# Patient Record
Sex: MALE | Race: WHITE | NOT HISPANIC OR LATINO
[De-identification: names, ages, dates, MRNs, and addresses within clinical notes are randomized per-mention and may not be internally consistent; named-entity substitution may affect disease eponyms.]

---

## 2017-03-07 ENCOUNTER — APPOINTMENT (OUTPATIENT)
Dept: CT IMAGING | Facility: IMAGING CENTER | Age: 82
End: 2017-03-07

## 2017-03-07 ENCOUNTER — OUTPATIENT (OUTPATIENT)
Dept: OUTPATIENT SERVICES | Facility: HOSPITAL | Age: 82
LOS: 1 days | End: 2017-03-07
Payer: MEDICARE

## 2017-03-07 DIAGNOSIS — C18.2 MALIGNANT NEOPLASM OF ASCENDING COLON: ICD-10-CM

## 2017-03-07 DIAGNOSIS — Z98.89 OTHER SPECIFIED POSTPROCEDURAL STATES: Chronic | ICD-10-CM

## 2017-03-07 DIAGNOSIS — S32.409A UNSPECIFIED FRACTURE OF UNSPECIFIED ACETABULUM, INITIAL ENCOUNTER FOR CLOSED FRACTURE: Chronic | ICD-10-CM

## 2017-03-07 PROCEDURE — 74177 CT ABD & PELVIS W/CONTRAST: CPT

## 2017-03-07 PROCEDURE — 71260 CT THORAX DX C+: CPT

## 2017-03-07 PROCEDURE — 82565 ASSAY OF CREATININE: CPT

## 2017-07-14 ENCOUNTER — OUTPATIENT (OUTPATIENT)
Dept: OUTPATIENT SERVICES | Facility: HOSPITAL | Age: 82
LOS: 1 days | End: 2017-07-14
Payer: MEDICARE

## 2017-07-14 ENCOUNTER — APPOINTMENT (OUTPATIENT)
Dept: CT IMAGING | Facility: IMAGING CENTER | Age: 82
End: 2017-07-14

## 2017-07-14 DIAGNOSIS — Z98.89 OTHER SPECIFIED POSTPROCEDURAL STATES: Chronic | ICD-10-CM

## 2017-07-14 DIAGNOSIS — Z00.8 ENCOUNTER FOR OTHER GENERAL EXAMINATION: ICD-10-CM

## 2017-07-14 DIAGNOSIS — S32.409A UNSPECIFIED FRACTURE OF UNSPECIFIED ACETABULUM, INITIAL ENCOUNTER FOR CLOSED FRACTURE: Chronic | ICD-10-CM

## 2017-07-14 PROCEDURE — 71250 CT THORAX DX C-: CPT

## 2018-04-12 ENCOUNTER — OUTPATIENT (OUTPATIENT)
Dept: OUTPATIENT SERVICES | Facility: HOSPITAL | Age: 83
LOS: 1 days | End: 2018-04-12
Payer: MEDICARE

## 2018-04-12 ENCOUNTER — APPOINTMENT (OUTPATIENT)
Dept: CT IMAGING | Facility: IMAGING CENTER | Age: 83
End: 2018-04-12
Payer: MEDICARE

## 2018-04-12 DIAGNOSIS — S32.409A UNSPECIFIED FRACTURE OF UNSPECIFIED ACETABULUM, INITIAL ENCOUNTER FOR CLOSED FRACTURE: Chronic | ICD-10-CM

## 2018-04-12 DIAGNOSIS — Z98.89 OTHER SPECIFIED POSTPROCEDURAL STATES: Chronic | ICD-10-CM

## 2018-04-12 DIAGNOSIS — Z00.8 ENCOUNTER FOR OTHER GENERAL EXAMINATION: ICD-10-CM

## 2018-04-12 PROCEDURE — 71260 CT THORAX DX C+: CPT

## 2018-04-12 PROCEDURE — 82565 ASSAY OF CREATININE: CPT

## 2018-04-12 PROCEDURE — 74177 CT ABD & PELVIS W/CONTRAST: CPT

## 2018-04-12 PROCEDURE — 74177 CT ABD & PELVIS W/CONTRAST: CPT | Mod: 26

## 2018-04-12 PROCEDURE — 71260 CT THORAX DX C+: CPT | Mod: 26

## 2019-02-04 ENCOUNTER — OUTPATIENT (OUTPATIENT)
Dept: OUTPATIENT SERVICES | Facility: HOSPITAL | Age: 84
LOS: 1 days | End: 2019-02-04
Payer: MEDICARE

## 2019-02-04 ENCOUNTER — APPOINTMENT (OUTPATIENT)
Dept: CT IMAGING | Facility: IMAGING CENTER | Age: 84
End: 2019-02-04
Payer: MEDICARE

## 2019-02-04 DIAGNOSIS — S32.409A UNSPECIFIED FRACTURE OF UNSPECIFIED ACETABULUM, INITIAL ENCOUNTER FOR CLOSED FRACTURE: Chronic | ICD-10-CM

## 2019-02-04 DIAGNOSIS — Z00.8 ENCOUNTER FOR OTHER GENERAL EXAMINATION: ICD-10-CM

## 2019-02-04 DIAGNOSIS — Z98.89 OTHER SPECIFIED POSTPROCEDURAL STATES: Chronic | ICD-10-CM

## 2019-02-04 PROCEDURE — 71260 CT THORAX DX C+: CPT | Mod: 26

## 2019-02-04 PROCEDURE — 82565 ASSAY OF CREATININE: CPT

## 2019-02-04 PROCEDURE — 74177 CT ABD & PELVIS W/CONTRAST: CPT

## 2019-02-04 PROCEDURE — 74177 CT ABD & PELVIS W/CONTRAST: CPT | Mod: 26

## 2019-02-04 PROCEDURE — 71260 CT THORAX DX C+: CPT

## 2019-02-21 ENCOUNTER — OUTPATIENT (OUTPATIENT)
Dept: OUTPATIENT SERVICES | Facility: HOSPITAL | Age: 84
LOS: 1 days | End: 2019-02-21
Payer: MEDICARE

## 2019-02-21 VITALS
DIASTOLIC BLOOD PRESSURE: 82 MMHG | OXYGEN SATURATION: 96 % | TEMPERATURE: 98 F | HEIGHT: 68 IN | SYSTOLIC BLOOD PRESSURE: 129 MMHG | RESPIRATION RATE: 18 BRPM | WEIGHT: 164.91 LBS | HEART RATE: 67 BPM

## 2019-02-21 DIAGNOSIS — S32.409A UNSPECIFIED FRACTURE OF UNSPECIFIED ACETABULUM, INITIAL ENCOUNTER FOR CLOSED FRACTURE: Chronic | ICD-10-CM

## 2019-02-21 DIAGNOSIS — Z01.818 ENCOUNTER FOR OTHER PREPROCEDURAL EXAMINATION: ICD-10-CM

## 2019-02-21 DIAGNOSIS — Z98.89 OTHER SPECIFIED POSTPROCEDURAL STATES: Chronic | ICD-10-CM

## 2019-02-21 DIAGNOSIS — N32.89 OTHER SPECIFIED DISORDERS OF BLADDER: ICD-10-CM

## 2019-02-21 LAB
ANION GAP SERPL CALC-SCNC: 14 MMOL/L — SIGNIFICANT CHANGE UP (ref 5–17)
BUN SERPL-MCNC: 23 MG/DL — SIGNIFICANT CHANGE UP (ref 7–23)
CALCIUM SERPL-MCNC: 9.9 MG/DL — SIGNIFICANT CHANGE UP (ref 8.4–10.5)
CHLORIDE SERPL-SCNC: 104 MMOL/L — SIGNIFICANT CHANGE UP (ref 96–108)
CO2 SERPL-SCNC: 22 MMOL/L — SIGNIFICANT CHANGE UP (ref 22–31)
CREAT SERPL-MCNC: 0.69 MG/DL — SIGNIFICANT CHANGE UP (ref 0.5–1.3)
GLUCOSE SERPL-MCNC: 110 MG/DL — HIGH (ref 70–99)
POTASSIUM SERPL-MCNC: 4.4 MMOL/L — SIGNIFICANT CHANGE UP (ref 3.5–5.3)
POTASSIUM SERPL-SCNC: 4.4 MMOL/L — SIGNIFICANT CHANGE UP (ref 3.5–5.3)
SODIUM SERPL-SCNC: 140 MMOL/L — SIGNIFICANT CHANGE UP (ref 135–145)

## 2019-02-21 PROCEDURE — 87086 URINE CULTURE/COLONY COUNT: CPT

## 2019-02-21 PROCEDURE — 85027 COMPLETE CBC AUTOMATED: CPT

## 2019-02-21 PROCEDURE — 80048 BASIC METABOLIC PNL TOTAL CA: CPT

## 2019-02-21 PROCEDURE — G0463: CPT

## 2019-02-21 RX ORDER — LIDOCAINE HCL 20 MG/ML
0.2 VIAL (ML) INJECTION ONCE
Qty: 0 | Refills: 0 | Status: DISCONTINUED | OUTPATIENT
Start: 2019-02-28 | End: 2019-03-15

## 2019-02-21 RX ORDER — CEFAZOLIN SODIUM 1 G
2000 VIAL (EA) INJECTION ONCE
Qty: 0 | Refills: 0 | Status: DISCONTINUED | OUTPATIENT
Start: 2019-02-28 | End: 2019-03-15

## 2019-02-21 RX ORDER — SODIUM CHLORIDE 9 MG/ML
3 INJECTION INTRAMUSCULAR; INTRAVENOUS; SUBCUTANEOUS EVERY 8 HOURS
Qty: 0 | Refills: 0 | Status: DISCONTINUED | OUTPATIENT
Start: 2019-02-28 | End: 2019-03-15

## 2019-02-21 NOTE — H&P PST ADULT - NSANTHOSAYNRD_GEN_A_CORE
No. MARCIO screening performed.  STOP BANG Legend: 0-2 = LOW Risk; 3-4 = INTERMEDIATE Risk; 5-8 = HIGH Risk

## 2019-02-21 NOTE — H&P PST ADULT - HISTORY OF PRESENT ILLNESS
Pt. is a 83 year old male with PMH. Pt. is now scheduled for a Transurethral resection of bladder tumor on 2/28/2019. Pt. is a 83 year old male with PMH of hld, colon ca colon resection and chemotherapy 2015. Pt. is now scheduled for a Transurethral resection of bladder tumor on 2/28/2019. Pt. is a 83 year old male with PMH of hld, colon ca colon resection and chemotherapy 2015. During follow up with oncologist incidental finding of nodule in the bladder on CT scan. pt. has not been experiencing any symptoms Pt. is now scheduled for a Transurethral resection of bladder tumor on 2/28/2019. Pt. is a 83 year old male with PMH of hld, colon ca colon resection and chemotherapy 2015. During follow up with oncologist incidental finding of nodule in the bladder on CT scan. pt. has not been experiencing any symptoms Pt. is now scheduled for a cystoscopy Transurethral resection of bladder tumor on 2/28/2019.

## 2019-02-21 NOTE — H&P PST ADULT - PROBLEM SELECTOR PLAN 1
Pt. scheduled Cystoscopy Transurethral resection of bladder tumor on 2/28/2019.  CBC ,BMP, urine culture   EKG done on 2/13/19 at Dr. Freed

## 2019-02-22 LAB
CULTURE RESULTS: SIGNIFICANT CHANGE UP
HCT VFR BLD CALC: 44.5 % — SIGNIFICANT CHANGE UP (ref 39–50)
HGB BLD-MCNC: 14.1 G/DL — SIGNIFICANT CHANGE UP (ref 13–17)
MCHC RBC-ENTMCNC: 31.7 GM/DL — LOW (ref 32–36)
MCHC RBC-ENTMCNC: 32.2 PG — SIGNIFICANT CHANGE UP (ref 27–34)
MCV RBC AUTO: 101.6 FL — HIGH (ref 80–100)
PLATELET # BLD AUTO: 318 K/UL — SIGNIFICANT CHANGE UP (ref 150–400)
RBC # BLD: 4.38 M/UL — SIGNIFICANT CHANGE UP (ref 4.2–5.8)
RBC # FLD: 14 % — SIGNIFICANT CHANGE UP (ref 10.3–14.5)
SPECIMEN SOURCE: SIGNIFICANT CHANGE UP
WBC # BLD: 8.34 K/UL — SIGNIFICANT CHANGE UP (ref 3.8–10.5)
WBC # FLD AUTO: 8.34 K/UL — SIGNIFICANT CHANGE UP (ref 3.8–10.5)

## 2019-02-27 ENCOUNTER — TRANSCRIPTION ENCOUNTER (OUTPATIENT)
Age: 84
End: 2019-02-27

## 2019-02-28 ENCOUNTER — RESULT REVIEW (OUTPATIENT)
Age: 84
End: 2019-02-28

## 2019-02-28 ENCOUNTER — OUTPATIENT (OUTPATIENT)
Dept: OUTPATIENT SERVICES | Facility: HOSPITAL | Age: 84
LOS: 1 days | End: 2019-02-28
Payer: MEDICARE

## 2019-02-28 VITALS
SYSTOLIC BLOOD PRESSURE: 126 MMHG | TEMPERATURE: 98 F | HEART RATE: 60 BPM | OXYGEN SATURATION: 97 % | RESPIRATION RATE: 18 BRPM | DIASTOLIC BLOOD PRESSURE: 74 MMHG

## 2019-02-28 VITALS
SYSTOLIC BLOOD PRESSURE: 146 MMHG | RESPIRATION RATE: 18 BRPM | OXYGEN SATURATION: 97 % | HEIGHT: 68 IN | DIASTOLIC BLOOD PRESSURE: 72 MMHG | HEART RATE: 68 BPM | WEIGHT: 164.91 LBS | TEMPERATURE: 98 F

## 2019-02-28 DIAGNOSIS — Z98.89 OTHER SPECIFIED POSTPROCEDURAL STATES: Chronic | ICD-10-CM

## 2019-02-28 DIAGNOSIS — S32.409A UNSPECIFIED FRACTURE OF UNSPECIFIED ACETABULUM, INITIAL ENCOUNTER FOR CLOSED FRACTURE: Chronic | ICD-10-CM

## 2019-02-28 DIAGNOSIS — N32.89 OTHER SPECIFIED DISORDERS OF BLADDER: ICD-10-CM

## 2019-02-28 PROCEDURE — 52235 CYSTOSCOPY AND TREATMENT: CPT

## 2019-02-28 PROCEDURE — 88305 TISSUE EXAM BY PATHOLOGIST: CPT

## 2019-02-28 PROCEDURE — 88305 TISSUE EXAM BY PATHOLOGIST: CPT | Mod: 26

## 2019-02-28 RX ORDER — ONDANSETRON 8 MG/1
4 TABLET, FILM COATED ORAL ONCE
Qty: 0 | Refills: 0 | Status: DISCONTINUED | OUTPATIENT
Start: 2019-02-28 | End: 2019-02-28

## 2019-02-28 RX ORDER — SODIUM CHLORIDE 9 MG/ML
1000 INJECTION, SOLUTION INTRAVENOUS
Qty: 0 | Refills: 0 | Status: DISCONTINUED | OUTPATIENT
Start: 2019-02-28 | End: 2019-03-15

## 2019-02-28 RX ORDER — ATORVASTATIN CALCIUM 80 MG/1
1 TABLET, FILM COATED ORAL
Qty: 0 | Refills: 0 | COMMUNITY

## 2019-02-28 RX ORDER — PHENAZOPYRIDINE HCL 100 MG
1 TABLET ORAL
Qty: 6 | Refills: 0 | OUTPATIENT
Start: 2019-02-28 | End: 2019-03-01

## 2019-02-28 RX ORDER — DENOSUMAB 60 MG/ML
0 INJECTION SUBCUTANEOUS
Qty: 0 | Refills: 0 | COMMUNITY

## 2019-02-28 RX ORDER — ASPIRIN/CALCIUM CARB/MAGNESIUM 324 MG
1 TABLET ORAL
Qty: 0 | Refills: 0 | COMMUNITY

## 2019-02-28 RX ORDER — CEPHALEXIN 500 MG
1 CAPSULE ORAL
Qty: 6 | Refills: 0 | OUTPATIENT
Start: 2019-02-28 | End: 2019-03-02

## 2019-02-28 RX ORDER — TAMSULOSIN HYDROCHLORIDE 0.4 MG/1
1 CAPSULE ORAL
Qty: 0 | Refills: 0 | COMMUNITY

## 2019-02-28 RX ORDER — ASPIRIN/CALCIUM CARB/MAGNESIUM 324 MG
81 TABLET ORAL DAILY
Qty: 0 | Refills: 0 | Status: DISCONTINUED | OUTPATIENT
Start: 2019-02-28 | End: 2019-03-15

## 2019-02-28 RX ORDER — HYDROMORPHONE HYDROCHLORIDE 2 MG/ML
0.25 INJECTION INTRAMUSCULAR; INTRAVENOUS; SUBCUTANEOUS
Qty: 0 | Refills: 0 | Status: DISCONTINUED | OUTPATIENT
Start: 2019-02-28 | End: 2019-02-28

## 2019-02-28 RX ADMIN — SODIUM CHLORIDE 3 MILLILITER(S): 9 INJECTION INTRAMUSCULAR; INTRAVENOUS; SUBCUTANEOUS at 08:16

## 2019-02-28 NOTE — ASU DISCHARGE PLAN (ADULT/PEDIATRIC). - SPECIAL INSTRUCTIONS
Please follow-up with Dr. Goldberg in his office tomorrow morning (3/1) for yeh catheter removal.    Please take prescribed antibiotics until course is completed. Take pyridium as prescribed for urinary irritation.

## 2019-02-28 NOTE — ASU DISCHARGE PLAN (ADULT/PEDIATRIC). - MEDICATION SUMMARY - MEDICATIONS TO TAKE
I will START or STAY ON the medications listed below when I get home from the hospital:    aspirin 81 mg oral tablet, chewable  -- 1 tab(s) by mouth once a day  -- Indication: For Home med    tamsulosin 0.4 mg oral capsule  -- 1 cap(s) by mouth once a day  -- Indication: For Home med    atorvastatin 10 mg oral tablet  -- 1 tab(s) by mouth once a day  -- Indication: For Home med    Prolia 60 mg/mL subcutaneous solution  -- every 6 months   -- Indication: For Home med    Keflex 500 mg oral capsule  -- 1 cap(s) by mouth 2 times a day   -- Finish all this medication unless otherwise directed by prescriber.    -- Indication: For Antibiotic    Pyridium 200 mg oral tablet  -- 1 tab(s) by mouth 3 times a day   -- May discolor urine or feces.  Medication should be taken with plenty of water.  Take with food or milk.    -- Indication: For Urinary discomfort    multivitamin  --   by mouth daily  -- Indication: For Home supplement    ergocalciferol 50,000 intl units (1.25 mg) oral capsule  --  by mouth once a week  -- Indication: For Home supplement

## 2019-02-28 NOTE — BRIEF OPERATIVE NOTE - PROCEDURE
<<-----Click on this checkbox to enter Procedure TURBT (transurethral resection of bladder tumor)  02/28/2019    Active  [id number]

## 2019-02-28 NOTE — BRIEF OPERATIVE NOTE - OPERATION/FINDINGS
~1cm papillary appearing tumor along right lateral wall    No evidence of tumor on bimanual exam 3 cm papillary appearing tumor along right lateral wall    No evidence of tumor on bimanual exam

## 2019-03-01 LAB — SURGICAL PATHOLOGY STUDY: SIGNIFICANT CHANGE UP

## 2019-05-30 NOTE — PRE-ANESTHESIA EVALUATION ADULT - NSANTHBPHIGHRD_ENT_A_CORE
 Continue Breo ellipta daily,  rinse mouth out after use     Continue albuterol  MDI every 4-6 hours as needed for shortness of breath, wheezing, or cough    · Take over the counter mucinex for cough, increase PO fluids    · Continue a azithromycin, 250 mg daily    · Continue to use coolmist humidifier at night     Take over the counter flonase and / or claritin / zyrtec daily for allergy control    · Avoid allergens/precipitants as much as possible     Follow-up in pulmonary clinic in 6 months or sooner with worsening of symptoms     Report to the ER with chest pain or difficulty breathing
No

## 2019-10-02 ENCOUNTER — EMERGENCY (EMERGENCY)
Facility: HOSPITAL | Age: 84
LOS: 1 days | Discharge: HOME | End: 2019-10-02
Attending: EMERGENCY MEDICINE
Payer: MEDICARE

## 2019-10-02 VITALS
OXYGEN SATURATION: 97 % | HEART RATE: 72 BPM | SYSTOLIC BLOOD PRESSURE: 105 MMHG | DIASTOLIC BLOOD PRESSURE: 91 MMHG | RESPIRATION RATE: 18 BRPM | TEMPERATURE: 98 F

## 2019-10-02 VITALS
HEIGHT: 68 IN | TEMPERATURE: 101 F | DIASTOLIC BLOOD PRESSURE: 75 MMHG | OXYGEN SATURATION: 97 % | SYSTOLIC BLOOD PRESSURE: 130 MMHG | RESPIRATION RATE: 20 BRPM | WEIGHT: 158.95 LBS | HEART RATE: 87 BPM

## 2019-10-02 DIAGNOSIS — Z98.89 OTHER SPECIFIED POSTPROCEDURAL STATES: Chronic | ICD-10-CM

## 2019-10-02 DIAGNOSIS — S32.409A UNSPECIFIED FRACTURE OF UNSPECIFIED ACETABULUM, INITIAL ENCOUNTER FOR CLOSED FRACTURE: Chronic | ICD-10-CM

## 2019-10-02 PROBLEM — E78.5 HYPERLIPIDEMIA, UNSPECIFIED: Chronic | Status: ACTIVE | Noted: 2019-02-21

## 2019-10-02 PROBLEM — C18.9 MALIGNANT NEOPLASM OF COLON, UNSPECIFIED: Chronic | Status: ACTIVE | Noted: 2019-02-21

## 2019-10-02 LAB
ALBUMIN SERPL ELPH-MCNC: 4.1 G/DL — SIGNIFICANT CHANGE UP (ref 3.3–5)
ALP SERPL-CCNC: 54 U/L — SIGNIFICANT CHANGE UP (ref 40–120)
ALT FLD-CCNC: 8 U/L — LOW (ref 10–45)
ANION GAP SERPL CALC-SCNC: 15 MMOL/L — SIGNIFICANT CHANGE UP (ref 5–17)
APPEARANCE UR: CLEAR — SIGNIFICANT CHANGE UP
AST SERPL-CCNC: 9 U/L — LOW (ref 10–40)
BACTERIA # UR AUTO: NEGATIVE — SIGNIFICANT CHANGE UP
BASOPHILS # BLD AUTO: 0 K/UL — SIGNIFICANT CHANGE UP (ref 0–0.2)
BASOPHILS NFR BLD AUTO: 0 % — SIGNIFICANT CHANGE UP (ref 0–2)
BILIRUB SERPL-MCNC: 0.4 MG/DL — SIGNIFICANT CHANGE UP (ref 0.2–1.2)
BILIRUB UR-MCNC: NEGATIVE — SIGNIFICANT CHANGE UP
BUN SERPL-MCNC: 18 MG/DL — SIGNIFICANT CHANGE UP (ref 7–23)
CALCIUM SERPL-MCNC: 9.3 MG/DL — SIGNIFICANT CHANGE UP (ref 8.4–10.5)
CHLORIDE SERPL-SCNC: 98 MMOL/L — SIGNIFICANT CHANGE UP (ref 96–108)
CO2 SERPL-SCNC: 24 MMOL/L — SIGNIFICANT CHANGE UP (ref 22–31)
COLOR SPEC: YELLOW — SIGNIFICANT CHANGE UP
CREAT SERPL-MCNC: 0.83 MG/DL — SIGNIFICANT CHANGE UP (ref 0.5–1.3)
DIFF PNL FLD: ABNORMAL
EOSINOPHIL # BLD AUTO: 0 K/UL — SIGNIFICANT CHANGE UP (ref 0–0.5)
EOSINOPHIL NFR BLD AUTO: 0 % — SIGNIFICANT CHANGE UP (ref 0–6)
EPI CELLS # UR: 1 /HPF — SIGNIFICANT CHANGE UP
GAS PNL BLDV: SIGNIFICANT CHANGE UP
GAS PNL BLDV: SIGNIFICANT CHANGE UP
GLUCOSE SERPL-MCNC: 139 MG/DL — HIGH (ref 70–99)
GLUCOSE UR QL: NEGATIVE — SIGNIFICANT CHANGE UP
HCT VFR BLD CALC: 40.6 % — SIGNIFICANT CHANGE UP (ref 39–50)
HGB BLD-MCNC: 13.7 G/DL — SIGNIFICANT CHANGE UP (ref 13–17)
HYALINE CASTS # UR AUTO: 0 /LPF — SIGNIFICANT CHANGE UP (ref 0–7)
KETONES UR-MCNC: ABNORMAL
LEUKOCYTE ESTERASE UR-ACNC: NEGATIVE — SIGNIFICANT CHANGE UP
LYMPHOCYTES # BLD AUTO: 0.37 K/UL — LOW (ref 1–3.3)
LYMPHOCYTES # BLD AUTO: 1.7 % — LOW (ref 13–44)
MCHC RBC-ENTMCNC: 31.9 PG — SIGNIFICANT CHANGE UP (ref 27–34)
MCHC RBC-ENTMCNC: 33.7 GM/DL — SIGNIFICANT CHANGE UP (ref 32–36)
MCV RBC AUTO: 94.4 FL — SIGNIFICANT CHANGE UP (ref 80–100)
MONOCYTES # BLD AUTO: 1.55 K/UL — HIGH (ref 0–0.9)
MONOCYTES NFR BLD AUTO: 7.1 % — SIGNIFICANT CHANGE UP (ref 2–14)
NEUTROPHILS # BLD AUTO: 19.69 K/UL — HIGH (ref 1.8–7.4)
NEUTROPHILS NFR BLD AUTO: 84.1 % — HIGH (ref 43–77)
NITRITE UR-MCNC: NEGATIVE — SIGNIFICANT CHANGE UP
PH UR: 6 — SIGNIFICANT CHANGE UP (ref 5–8)
PLATELET # BLD AUTO: 334 K/UL — SIGNIFICANT CHANGE UP (ref 150–400)
POTASSIUM SERPL-MCNC: 4.2 MMOL/L — SIGNIFICANT CHANGE UP (ref 3.5–5.3)
POTASSIUM SERPL-SCNC: 4.2 MMOL/L — SIGNIFICANT CHANGE UP (ref 3.5–5.3)
PROT SERPL-MCNC: 7.1 G/DL — SIGNIFICANT CHANGE UP (ref 6–8.3)
PROT UR-MCNC: 100 — SIGNIFICANT CHANGE UP
RAPID RVP RESULT: SIGNIFICANT CHANGE UP
RBC # BLD: 4.3 M/UL — SIGNIFICANT CHANGE UP (ref 4.2–5.8)
RBC # FLD: 13 % — SIGNIFICANT CHANGE UP (ref 10.3–14.5)
RBC CASTS # UR COMP ASSIST: 21 /HPF — HIGH (ref 0–4)
SODIUM SERPL-SCNC: 137 MMOL/L — SIGNIFICANT CHANGE UP (ref 135–145)
SP GR SPEC: 1.03 — HIGH (ref 1.01–1.02)
TROPONIN T, HIGH SENSITIVITY RESULT: 14 NG/L — SIGNIFICANT CHANGE UP (ref 0–51)
TROPONIN T, HIGH SENSITIVITY RESULT: 14 NG/L — SIGNIFICANT CHANGE UP (ref 0–51)
UROBILINOGEN FLD QL: NEGATIVE — SIGNIFICANT CHANGE UP
WBC # BLD: 21.8 K/UL — HIGH (ref 3.8–10.5)
WBC # FLD AUTO: 21.8 K/UL — HIGH (ref 3.8–10.5)
WBC UR QL: 2 /HPF — SIGNIFICANT CHANGE UP (ref 0–5)

## 2019-10-02 PROCEDURE — 99284 EMERGENCY DEPT VISIT MOD MDM: CPT

## 2019-10-02 PROCEDURE — 71046 X-RAY EXAM CHEST 2 VIEWS: CPT | Mod: 26

## 2019-10-02 RX ORDER — SODIUM CHLORIDE 9 MG/ML
1000 INJECTION INTRAMUSCULAR; INTRAVENOUS; SUBCUTANEOUS ONCE
Refills: 0 | Status: COMPLETED | OUTPATIENT
Start: 2019-10-02 | End: 2019-10-02

## 2019-10-02 RX ORDER — ACETAMINOPHEN 500 MG
975 TABLET ORAL ONCE
Refills: 0 | Status: COMPLETED | OUTPATIENT
Start: 2019-10-02 | End: 2019-10-02

## 2019-10-02 RX ADMIN — SODIUM CHLORIDE 1000 MILLILITER(S): 9 INJECTION INTRAMUSCULAR; INTRAVENOUS; SUBCUTANEOUS at 16:29

## 2019-10-02 RX ADMIN — Medication 975 MILLIGRAM(S): at 16:29

## 2019-10-02 NOTE — ED ADULT NURSE NOTE - NSIMPLEMENTINTERV_GEN_ALL_ED
Implemented All Universal Safety Interventions:  [place] to call system. Call bell, personal items and telephone within reach. Instruct patient to call for assistance. Room bathroom lighting operational. Non-slip footwear when patient is off stretcher. Physically safe environment: no spills, clutter or unnecessary equipment. Stretcher in lowest position, wheels locked, appropriate side rails in place.

## 2019-10-02 NOTE — ED PROVIDER NOTE - OBJECTIVE STATEMENT
85 y/o M no significant pmhx presenting with fever, chills for 3 days with myalgias. Patient states that he went to his PMD Dr. Freed today for evaluation, and after flu was negative and patient had fever in the office he was sent to the ED for more work-up. Patient denies any cough, urinary symptoms, headaches, nausea, vomiting, abdominal pain. He states that he had a R hip replacement in july and has been having some residual knee pain; however, knee has been not red or inflamed.

## 2019-10-02 NOTE — ED PROVIDER NOTE - NSFOLLOWUPINSTRUCTIONS_ED_ALL_ED_FT
1. Continue to take any home medications as needed and tylenol or motrin as needed for fever   2. Follow-up with Dr. Freed tomorrow   3. Return to the ER for any new or worsening symptoms

## 2019-10-02 NOTE — ED ADULT NURSE NOTE - OBJECTIVE STATEMENT
84y male coming in from home c/o weakness. A&Ox3 with wife at bedside. 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 Pt presents to ED c/o weakness, chills and headache starting yesterday morning. Pt did not take temperature. Pt also reports myalgias and b/l knee joint pain. Denies chest pain, sob, n/v/d. Denies abdominal pain and urinary symptoms. Upon exam, lung sounds clear 84y male coming in from home c/o weakness. A&Ox3 with wife at bedside. 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 Pt presents to ED c/o weakness, chills and headache starting yesterday morning. Pt did not take temperature. Pt also reports myalgias and b/l knee joint pain. Denies chest pain, sob, n/v/d. Denies abdominal pain and urinary symptoms. Upon exam, pt febrile to 101.6F orally. Lung sounds clear. Abdomen soft, nontender. No redness/warmth/swelling noted to knees or hip. Cap resill <2 sec. 84y male coming in from home c/o weakness. A&Ox3 with wife at bedside. 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 Pt presents to ED c/o weakness, chills and headache starting yesterday morning. Pt did not take temperature at home. Pt also reports myalgias and b/l knee joint pain. Denies chest pain, sob, n/v/d. Denies abdominal pain and urinary symptoms. Pt presented to PMD today who recommend pt come to ED. Flu swab negative at PMD office as per pt. Upon exam, pt febrile to 101.6F orally. Lung sounds clear. Abdomen soft, nontender. No redness/warmth/swelling noted to knees or hip. Cap refill <2 sec.

## 2019-10-02 NOTE — ED PROVIDER NOTE - PROGRESS NOTE DETAILS
spoke with patient's PMD. urine, cxr and labs are within normal limits aside from WBC of 21 of unknown origin. advised him on results and given no source will follow-up blood and urine cultures as patient wants to go home. Dr. Freed states OK with d/c given following cultures and will see patient tomorrow. Will obtain RVP and reassess. -Ruth Gonzáles PA-C refusing to stay for RVP as has to get home to his dog. will follow-up RVP and contact patient as needed. -Ruth Gonzáles PA-C Dr. Marie: Pt signed out to me. Pt well appearing, elevated WBC count, febrile defervesced in ED, Lactate cleared, feels well after fluids, appears non toxic, is reliable and has plan for FU w PCP tomorrow. Given return precautions. Will call with RVP results as he does not wish to stay and would not likely change mgmt given OP flu swab neg.

## 2019-10-02 NOTE — ED ADULT NURSE REASSESSMENT NOTE - NS ED NURSE REASSESS COMMENT FT1
Repeat troponin and lactate sent to lab. Eliecer MARSHALL states if repeat results okay, pt to be discharged home with PMD follow up. Pt aware of plan of care.

## 2019-10-02 NOTE — ED PROVIDER NOTE - ATTENDING CONTRIBUTION TO CARE
84yr M hx of colon CA s/p resection not on chemo p/w 1-2 days of malaise, fevers from PMD office. had a neg flu swab there. reports having muscle aches and feeling under the weather. no vomiting, no cp. sob, had slight non productive cough, no urinary sx, no abd pain. denies rashes, no hip pain but does have rt knee pain.     exam notable for clear lungs, oropharynx, no murmur, warm to touch, soft non tender abd, full ROM in hip.  plan for infectious work up. 84yr M hx of colon CA s/p resection not on chemo p/w 1-2 days of malaise, fevers from PMD office. had a neg flu swab there. reports having muscle aches and feeling under the weather. no vomiting, no cp. sob, had slight non productive cough, no urinary sx, no abd pain. denies rashes, no hip pain but does have rt knee pain.     exam notable for clear lungs, oropharynx, no murmur, warm to touch, soft non tender abd, full ROM in hip.  plan for infectious work up.    work up notable for wbc of 20 but neg UA and CXR. pt continues to feel well. expresses desire to go home. had an extensive discussion with patient and wife regarding the recommendation of staying in [facility]U for continued monitoring and cultures, however pt says he prefers to go home, and will follow up the next day with his doctor. he lives [place]mins away with his wife, and appears to be reliable in following up.   PMD was contacted and agrees with close follow up.

## 2019-10-02 NOTE — ED PROVIDER NOTE - PATIENT PORTAL LINK FT
You can access the FollowMyHealth Patient Portal offered by [facility] by registering at the following website: [email or web address]. By joining [employer]’s FollowMyHealth portal, you will also be able to view your health information using other applications (apps) compatible with our system.

## 2019-10-02 NOTE — ED PROVIDER NOTE - NS ED ROS FT
Constitutional: +fever and chills  Eyes: No visual changes, eye pain or redness  HEENT: No throat pain, ear pain, nasal pain. No nose bleeding.  CV: No chest pain or lower extremity edema  Resp: No SOB no cough  GI: No abd pain. No nausea or vomiting. No diarrhea. No constipation.   : No dysuria, hematuria.   MSK: No musculoskeletal pain  Skin: No rash  Neuro: No headache. No numbness or tingling. No weakness.

## 2019-10-03 ENCOUNTER — INPATIENT (INPATIENT)
Facility: HOSPITAL | Age: 84
LOS: 0 days | Discharge: HOME | DRG: 560 | End: 2019-10-03
Attending: INTERNAL MEDICINE | Admitting: INTERNAL MEDICINE
Payer: MEDICARE

## 2019-10-03 VITALS
HEIGHT: 68 IN | DIASTOLIC BLOOD PRESSURE: 87 MMHG | RESPIRATION RATE: 18 BRPM | TEMPERATURE: 98 F | HEART RATE: 72 BPM | OXYGEN SATURATION: 97 % | WEIGHT: 158.95 LBS | SYSTOLIC BLOOD PRESSURE: 134 MMHG

## 2019-10-03 VITALS
DIASTOLIC BLOOD PRESSURE: 80 MMHG | OXYGEN SATURATION: 100 % | RESPIRATION RATE: 17 BRPM | TEMPERATURE: 98 F | HEART RATE: 68 BPM | SYSTOLIC BLOOD PRESSURE: 134 MMHG

## 2019-10-03 DIAGNOSIS — R78.81 BACTEREMIA: ICD-10-CM

## 2019-10-03 DIAGNOSIS — S32.409A UNSPECIFIED FRACTURE OF UNSPECIFIED ACETABULUM, INITIAL ENCOUNTER FOR CLOSED FRACTURE: Chronic | ICD-10-CM

## 2019-10-03 DIAGNOSIS — Z98.89 OTHER SPECIFIED POSTPROCEDURAL STATES: Chronic | ICD-10-CM

## 2019-10-03 LAB
ALBUMIN SERPL ELPH-MCNC: 3.6 G/DL — SIGNIFICANT CHANGE UP (ref 3.3–5)
ALP SERPL-CCNC: 59 U/L — SIGNIFICANT CHANGE UP (ref 40–120)
ALT FLD-CCNC: 7 U/L — LOW (ref 10–45)
ANION GAP SERPL CALC-SCNC: 15 MMOL/L — SIGNIFICANT CHANGE UP (ref 5–17)
APTT BLD: 29.4 SEC — SIGNIFICANT CHANGE UP (ref 27.5–36.3)
AST SERPL-CCNC: 14 U/L — SIGNIFICANT CHANGE UP (ref 10–40)
BASOPHILS # BLD AUTO: 0.02 K/UL — SIGNIFICANT CHANGE UP (ref 0–0.2)
BASOPHILS NFR BLD AUTO: 0.1 % — SIGNIFICANT CHANGE UP (ref 0–2)
BILIRUB SERPL-MCNC: 0.3 MG/DL — SIGNIFICANT CHANGE UP (ref 0.2–1.2)
BUN SERPL-MCNC: 20 MG/DL — SIGNIFICANT CHANGE UP (ref 7–23)
CALCIUM SERPL-MCNC: 8.7 MG/DL — SIGNIFICANT CHANGE UP (ref 8.4–10.5)
CHLORIDE SERPL-SCNC: 100 MMOL/L — SIGNIFICANT CHANGE UP (ref 96–108)
CO2 SERPL-SCNC: 20 MMOL/L — LOW (ref 22–31)
CREAT SERPL-MCNC: 0.69 MG/DL — SIGNIFICANT CHANGE UP (ref 0.5–1.3)
CRP SERPL-MCNC: 25.21 MG/DL — HIGH (ref 0–0.4)
CULTURE RESULTS: SIGNIFICANT CHANGE UP
EOSINOPHIL # BLD AUTO: 0.01 K/UL — SIGNIFICANT CHANGE UP (ref 0–0.5)
EOSINOPHIL NFR BLD AUTO: 0.1 % — SIGNIFICANT CHANGE UP (ref 0–6)
GAS PNL BLDV: SIGNIFICANT CHANGE UP
GLUCOSE SERPL-MCNC: 125 MG/DL — HIGH (ref 70–99)
GP B STREP DNA BLD POS QL NAA+NON-PROBE: SIGNIFICANT CHANGE UP
GRAM STN FLD: SIGNIFICANT CHANGE UP
HCT VFR BLD CALC: 37 % — LOW (ref 39–50)
HGB BLD-MCNC: 12.7 G/DL — LOW (ref 13–17)
IMM GRANULOCYTES NFR BLD AUTO: 0.6 % — SIGNIFICANT CHANGE UP (ref 0–1.5)
INR BLD: 1.21 RATIO — HIGH (ref 0.88–1.16)
LYMPHOCYTES # BLD AUTO: 0.93 K/UL — LOW (ref 1–3.3)
LYMPHOCYTES # BLD AUTO: 5.8 % — LOW (ref 13–44)
MCHC RBC-ENTMCNC: 32.2 PG — SIGNIFICANT CHANGE UP (ref 27–34)
MCHC RBC-ENTMCNC: 34.3 GM/DL — SIGNIFICANT CHANGE UP (ref 32–36)
MCV RBC AUTO: 93.7 FL — SIGNIFICANT CHANGE UP (ref 80–100)
METHOD TYPE: SIGNIFICANT CHANGE UP
MONOCYTES # BLD AUTO: 1.17 K/UL — HIGH (ref 0–0.9)
MONOCYTES NFR BLD AUTO: 7.2 % — SIGNIFICANT CHANGE UP (ref 2–14)
NEUTROPHILS # BLD AUTO: 13.94 K/UL — HIGH (ref 1.8–7.4)
NEUTROPHILS NFR BLD AUTO: 86.2 % — HIGH (ref 43–77)
NRBC # BLD: 0 /100 WBCS — SIGNIFICANT CHANGE UP (ref 0–0)
PLATELET # BLD AUTO: 262 K/UL — SIGNIFICANT CHANGE UP (ref 150–400)
POTASSIUM SERPL-MCNC: 4.3 MMOL/L — SIGNIFICANT CHANGE UP (ref 3.5–5.3)
POTASSIUM SERPL-SCNC: 4.3 MMOL/L — SIGNIFICANT CHANGE UP (ref 3.5–5.3)
PROT SERPL-MCNC: 6.8 G/DL — SIGNIFICANT CHANGE UP (ref 6–8.3)
PROTHROM AB SERPL-ACNC: 13.9 SEC — HIGH (ref 10–12.9)
RBC # BLD: 3.95 M/UL — LOW (ref 4.2–5.8)
RBC # FLD: 12.9 % — SIGNIFICANT CHANGE UP (ref 10.3–14.5)
SODIUM SERPL-SCNC: 135 MMOL/L — SIGNIFICANT CHANGE UP (ref 135–145)
SPECIMEN SOURCE: SIGNIFICANT CHANGE UP
SPECIMEN SOURCE: SIGNIFICANT CHANGE UP
WBC # BLD: 16.16 K/UL — HIGH (ref 3.8–10.5)
WBC # FLD AUTO: 16.16 K/UL — HIGH (ref 3.8–10.5)

## 2019-10-03 PROCEDURE — 87486 CHLMYD PNEUM DNA AMP PROBE: CPT

## 2019-10-03 PROCEDURE — 82803 BLOOD GASES ANY COMBINATION: CPT

## 2019-10-03 PROCEDURE — 74177 CT ABD & PELVIS W/CONTRAST: CPT

## 2019-10-03 PROCEDURE — 99285 EMERGENCY DEPT VISIT HI MDM: CPT

## 2019-10-03 PROCEDURE — 87040 BLOOD CULTURE FOR BACTERIA: CPT

## 2019-10-03 PROCEDURE — 82435 ASSAY OF BLOOD CHLORIDE: CPT

## 2019-10-03 PROCEDURE — 80053 COMPREHEN METABOLIC PANEL: CPT

## 2019-10-03 PROCEDURE — 85014 HEMATOCRIT: CPT

## 2019-10-03 PROCEDURE — 99284 EMERGENCY DEPT VISIT MOD MDM: CPT | Mod: 25

## 2019-10-03 PROCEDURE — 87581 M.PNEUMON DNA AMP PROBE: CPT

## 2019-10-03 PROCEDURE — 85652 RBC SED RATE AUTOMATED: CPT

## 2019-10-03 PROCEDURE — 84484 ASSAY OF TROPONIN QUANT: CPT

## 2019-10-03 PROCEDURE — 83605 ASSAY OF LACTIC ACID: CPT

## 2019-10-03 PROCEDURE — 84132 ASSAY OF SERUM POTASSIUM: CPT

## 2019-10-03 PROCEDURE — 85610 PROTHROMBIN TIME: CPT

## 2019-10-03 PROCEDURE — 84295 ASSAY OF SERUM SODIUM: CPT

## 2019-10-03 PROCEDURE — 81001 URINALYSIS AUTO W/SCOPE: CPT

## 2019-10-03 PROCEDURE — 74177 CT ABD & PELVIS W/CONTRAST: CPT | Mod: 26

## 2019-10-03 PROCEDURE — 82330 ASSAY OF CALCIUM: CPT

## 2019-10-03 PROCEDURE — 87150 DNA/RNA AMPLIFIED PROBE: CPT

## 2019-10-03 PROCEDURE — 85730 THROMBOPLASTIN TIME PARTIAL: CPT

## 2019-10-03 PROCEDURE — 71046 X-RAY EXAM CHEST 2 VIEWS: CPT

## 2019-10-03 PROCEDURE — 82947 ASSAY GLUCOSE BLOOD QUANT: CPT

## 2019-10-03 PROCEDURE — 87798 DETECT AGENT NOS DNA AMP: CPT

## 2019-10-03 PROCEDURE — 85027 COMPLETE CBC AUTOMATED: CPT

## 2019-10-03 PROCEDURE — 99291 CRITICAL CARE FIRST HOUR: CPT

## 2019-10-03 PROCEDURE — 87086 URINE CULTURE/COLONY COUNT: CPT

## 2019-10-03 PROCEDURE — 86140 C-REACTIVE PROTEIN: CPT

## 2019-10-03 PROCEDURE — 87633 RESP VIRUS 12-25 TARGETS: CPT

## 2019-10-03 PROCEDURE — 93005 ELECTROCARDIOGRAM TRACING: CPT

## 2019-10-03 PROCEDURE — 87184 SC STD DISK METHOD PER PLATE: CPT

## 2019-10-03 RX ORDER — ATORVASTATIN CALCIUM 80 MG/1
10 TABLET, FILM COATED ORAL AT BEDTIME
Refills: 0 | Status: DISCONTINUED | OUTPATIENT
Start: 2019-10-03 | End: 2019-10-03

## 2019-10-03 RX ORDER — CEFTRIAXONE 500 MG/1
1000 INJECTION, POWDER, FOR SOLUTION INTRAMUSCULAR; INTRAVENOUS ONCE
Refills: 0 | Status: COMPLETED | OUTPATIENT
Start: 2019-10-03 | End: 2019-10-03

## 2019-10-03 RX ORDER — CEFTRIAXONE 500 MG/1
1000 INJECTION, POWDER, FOR SOLUTION INTRAMUSCULAR; INTRAVENOUS EVERY 24 HOURS
Refills: 0 | Status: DISCONTINUED | OUTPATIENT
Start: 2019-10-03 | End: 2019-10-03

## 2019-10-03 RX ORDER — OXYCODONE HYDROCHLORIDE 5 MG/1
5 TABLET ORAL EVERY 8 HOURS
Refills: 0 | Status: DISCONTINUED | OUTPATIENT
Start: 2019-10-03 | End: 2019-10-03

## 2019-10-03 RX ORDER — CEFTRIAXONE 500 MG/1
2000 INJECTION, POWDER, FOR SOLUTION INTRAMUSCULAR; INTRAVENOUS EVERY 24 HOURS
Refills: 0 | Status: DISCONTINUED | OUTPATIENT
Start: 2019-10-03 | End: 2019-10-03

## 2019-10-03 RX ORDER — TAMSULOSIN HYDROCHLORIDE 0.4 MG/1
0.4 CAPSULE ORAL AT BEDTIME
Refills: 0 | Status: DISCONTINUED | OUTPATIENT
Start: 2019-10-03 | End: 2019-10-03

## 2019-10-03 RX ORDER — HEPARIN SODIUM 5000 [USP'U]/ML
5000 INJECTION INTRAVENOUS; SUBCUTANEOUS EVERY 12 HOURS
Refills: 0 | Status: DISCONTINUED | OUTPATIENT
Start: 2019-10-03 | End: 2019-10-03

## 2019-10-03 RX ORDER — ASPIRIN/CALCIUM CARB/MAGNESIUM 324 MG
81 TABLET ORAL DAILY
Refills: 0 | Status: DISCONTINUED | OUTPATIENT
Start: 2019-10-03 | End: 2019-10-03

## 2019-10-03 RX ADMIN — CEFTRIAXONE 100 MILLIGRAM(S): 500 INJECTION, POWDER, FOR SOLUTION INTRAMUSCULAR; INTRAVENOUS at 17:06

## 2019-10-03 RX ADMIN — CEFTRIAXONE 100 MILLIGRAM(S): 500 INJECTION, POWDER, FOR SOLUTION INTRAMUSCULAR; INTRAVENOUS at 14:27

## 2019-10-03 NOTE — ED PROVIDER NOTE - NSPREEXISTRELATION_ED_A_ED_FT
Dr Montez performed R hip arthroplasty ~3 mo ago on patinet, now with findings suggestive of post-op infection.

## 2019-10-03 NOTE — ED PROVIDER NOTE - CRITICAL CARE INDICATION, MLM
patient was critically ill... Patient was critically ill with a high probability of imminent or life threatening deterioration 2/2 bacteremia from likely infected joint.

## 2019-10-03 NOTE — H&P ADULT - NSHPLABSRESULTS_GEN_ALL_CORE
LABS:                        12.7   16.16 )-----------( 262      ( 03 Oct 2019 11:24 )             37.0     10-03    135  |  100  |  20  ----------------------------<  125<H>  4.3   |  20<L>  |  0.69    Ca    8.7      03 Oct 2019 11:24    TPro  6.8  /  Alb  3.6  /  TBili  0.3  /  DBili  x   /  AST  14  /  ALT  7<L>  /  AlkPhos  59  10-03    PT/INR - ( 03 Oct 2019 11:24 )   PT: 13.9 sec;   INR: 1.21 ratio         PTT - ( 03 Oct 2019 11:24 )  PTT:29.4 sec      Urinalysis Basic - ( 02 Oct 2019 17:03 )    Color: Yellow / Appearance: Clear / S.026 / pH: x  Gluc: x / Ketone: Moderate  / Bili: Negative / Urobili: Negative   Blood: x / Protein: 100 / Nitrite: Negative   Leuk Esterase: Negative / RBC: 21 /hpf / WBC 2 /HPF   Sq Epi: x / Non Sq Epi: 1 /hpf / Bacteria: Negative          10-03 @ 11:24  4.5  52            Culture - Blood (collected 10-02-19 @ 22:17)  Source: .Blood  Gram Stain (10-03-19 @ 06:14):    Aerobic and Anaerobic Bottle: Gram Positive Cocci in Pairs and Chains  Preliminary Report (10-03-19 @ 06:15):    Aerobic and Anaerobic Bottle: Gram Positive Cocci in Pairs and Chains    "Due to technical problems, Proteus sp. will Not be reported as part of    the BCID panel until further notice"    ***Blood Panel PCR results on this specimen are available    approximately 3 hours after the Gram stain result.***    Gram stain, PCR, and/or culture results may not always    correspond due to difference in methodologies.    ************************************************************    This PCR assay was performed using [employer].    The following targets are tested for: Enterococcus,    vancomycin resistant enterococci, Listeria monocytogenes,    coagulase negative staphylococci, S. aureus,    methicillin resistant S. aureus, Streptococcus agalactiae    (Group B), S. pneumoniae, S. pyogenes (Group A),    Acinetobacter baumannii, Enterobacter cloacae, E. coli,    Klebsiella oxytoca, K. pneumoniae, Proteus sp.,    Serratia marcescens, Haemophilus influenzae,    Neisseria meningitidis, Pseudomonas aeruginosa, Candida    albicans, C. glabrata, C krusei, C parapsilosis,    C. tropicalis and the KPC resistance gene.  Organism: Blood Culture PCR (10-03-19 @ 08:21)  Organism: Blood Culture PCR (10-03-19 @ 08:21)      -  Streptococcus agalactiae (Group B): Detec      Method Type: PCR

## 2019-10-03 NOTE — ED POST DISCHARGE NOTE - DETAILS
10/3/19: Discussed with pt above results, that given symptoms and lab findings, we suggest he return for re-evaluation in the ED and possible admission. He states he feels well but understands and will return this morning. -Valentina Corral PA-C 10/8/2019: Called [facility], pt still admitted on [address], spoke with RN Althea and relayed results with readback, unsure what abx pt is currently on, Althea paged the ortho service caring for pt in the hospital. Spoke with ortho PA Xander, who states pt has had negative BCx there, will fax over report for them to review and Xander state she will give it to the ID specialist at [place]. Fax [phone]. -Chi England PA-C

## 2019-10-03 NOTE — H&P ADULT - NSHPREVIEWOFSYSTEMS_GEN_ALL_CORE
REVIEW OF SYSTEMS:  GEN:  fever,  / chills  RESP: no SOB,   no cough  CVS: no chest pain,   no palpitations  GI: no abdominal pain,   no nausea,   no vomiting,   no constipation,   no diarrhea  : no dysuria,   no frequency  NEURO: no headache,   no dizziness  PSYCH: no depression,   not anxious  Derm : no rash

## 2019-10-03 NOTE — ED ADULT NURSE NOTE - CHPI ED NUR SYMPTOMS POS
Original blood work, that was sent to lab, according to lab, is hemolyzed.   at bedside to draw labs.    chills/FEVER

## 2019-10-03 NOTE — ED ADULT NURSE REASSESSMENT NOTE - NS ED NURSE REASSESS COMMENT FT1
Patient being transferred to [facility] ED - patient received 1g Rocephin at 1427. As per Attending MD Gonzáles, patient to receive another 1g Rocephin prior to hospital transfer.

## 2019-10-03 NOTE — ED PROVIDER NOTE - CRITICAL CARE PROVIDED
additional history taking/consult w/ pt's family directly relating to pts condition/direct patient care (not related to procedure)/documentation/interpretation of diagnostic studies/consultation with other physicians

## 2019-10-03 NOTE — H&P ADULT - ASSESSMENT
84M     , pmh HLD,.  ca  colon.  bladder lesion,   hld , s/p R hip replacement 3 mo ago ([facility])/  prior  acebatular surg yrs  ago    returns to ED for + blood cultures.  pt presented yesterday on  10/2,  for fever, chills, myalgias /  wbc 22, , pt d/c'd with blood cultures pending and to f/u with pmd bellovin..    now  with   +for gram + cocci in pairs and chains,  in blood,      pt   had  fevers and chills this AM. was also complaining of mild R knee pain, no redness, swelling,   denies   cp, sob, abd pain, dysuria, hematuria, 	    pt with bacteremia/  group B Srep  on iv  rocephin   wbc  from 22,000 to  16.000   needs  right   hip  evaluated, given   h.o recent   hip surgery   await imaging  of  hip/ ortho eval may be  needed  awaiting ct hip   ID eval  called<  labs  in am    dvt ppx                                    Transthoracic Echocardiogram (07.26.16 @ 08:59) >  . Mitral annular calcification. Minimal mitral  regurgitation.  2. Normal trileaflet aortic valve. Mild aortic  insufficiency.  3. Normal aortic root.  4. Normal left atrium.  5. Normal left ventricular internal dimensions and wall  thicknesses.  6. Normal Left Ventricular Systolic Function,  (EF = 55 to  60%)  7. Grade I diastolic dysfunction  8. Normal right atrium.  9. Normal right ventricular size and function.  10. Unable to estimate RVSP.  11. Normal tricuspid valve.  12. Normal pulmonic valve.  13. Normal pericardium with no pericardial effusion  < end of copied text > 84M     , pmh HLD,.  ca  colon.  bladder lesion,   hld , s/p R hip replacement 3 mo ago ([facility])/  prior  acebatular surg yrs  ago    returns to ED for + blood cultures.  pt presented yesterday on  10/2,  for fever, chills, myalgias /  wbc 22, , pt d/c'd with blood cultures pending and to f/u with pmd bellovin..    now  with   +for gram + cocci in pairs and chains,  in blood,      pt   had  fevers and chills this AM. was also complaining of mild R knee pain, no redness, swelling,   denies   cp, sob, abd pain, dysuria, hematuria, 	    pt with bacteremia/  group B Srep  on iv  rocephin   wbc  from 22,000 to  16.000   needs  right   hip  evaluated, given   h.o recent   hip surgery   await imaging  of  hip/ ortho eval may be  needed  awaiting ct hip   ID eval  called  labs  in am    dvt ppx/   spoke with family    addendum   ct with  abscess right , hip/  ortho called by ER     < from: CT Abdomen and Pelvis w/ IV Cont (10.03.19 @ 12:18) >  IMPRESSION:   Status post total right hip arthroplasty. Interval development of   adjacent peritrochanteric fluid collection likely representing an abscess   measuring up to 11.2 cm. Adjacent subcutaneous inflammatory changes.  < end of copied text >                                    Transthoracic Echocardiogram (07.26.16 @ 08:59) >  . Mitral annular calcification. Minimal mitral  regurgitation.  2. Normal trileaflet aortic valve. Mild aortic  insufficiency.  3. Normal aortic root.  4. Normal left atrium.  5. Normal left ventricular internal dimensions and wall  thicknesses.  6. Normal Left Ventricular Systolic Function,  (EF = 55 to  60%)  7. Grade I diastolic dysfunction  8. Normal right atrium.  9. Normal right ventricular size and function.  10. Unable to estimate RVSP.  11. Normal tricuspid valve.  12. Normal pulmonic valve.  13. Normal pericardium with no pericardial effusion  < end of copied text > 84M     , pmh HLD,.  ca  colon.  bladder lesion,   hld , s/p R hip replacement 3 mo ago ([facility])/  prior  acebatular surg yrs  ago    returns to ED for + blood cultures.  pt presented yesterday on  10/2,  for fever, chills, myalgias /  wbc 22, , pt d/c'd with blood cultures pending and to f/u with pmd bellovin..    now  with   +for gram + cocci in pairs and chains,  in blood,      pt   had  fevers and chills this AM. was also complaining of mild R knee pain, no redness, swelling,   denies   cp, sob, abd pain, dysuria, hematuria, 	    pt with bacteremia/  group B Srep  on iv  rocephin/   cancelled  by er./ re ordered   wbc  from 22,000 to  16.000   needs  right   hip  evaluated, given   h.o recent   hip surgery   await imaging  of  hip/ ortho eval may be  needed  awaiting ct hip   ID eval  called  labs  in am    dvt ppx/   spoke with family    addendum   ct with  abscess right , hip/  ortho called by ER     < from: CT Abdomen and Pelvis w/ IV Cont (10.03.19 @ 12:18) >  IMPRESSION:   Status post total right hip arthroplasty. Interval development of   adjacent peritrochanteric fluid collection likely representing an abscess   measuring up to 11.2 cm. Adjacent subcutaneous inflammatory changes.  < end of copied text >                                    Transthoracic Echocardiogram (07.26.16 @ 08:59) >  . Mitral annular calcification. Minimal mitral  regurgitation.  2. Normal trileaflet aortic valve. Mild aortic  insufficiency.  3. Normal aortic root.  4. Normal left atrium.  5. Normal left ventricular internal dimensions and wall  thicknesses.  6. Normal Left Ventricular Systolic Function,  (EF = 55 to  60%)  7. Grade I diastolic dysfunction  8. Normal right atrium.  9. Normal right ventricular size and function.  10. Unable to estimate RVSP.  11. Normal tricuspid valve.  12. Normal pulmonic valve.  13. Normal pericardium with no pericardial effusion  < end of copied text >

## 2019-10-03 NOTE — CONSULT NOTE ADULT - SUBJECTIVE AND OBJECTIVE BOX
HPI:   Patient is a 84y male who underwent elective right thr in July at [facility]. He had uneventful surgery and recovery. A few weeks before he had a bladder tumor removed. He underwent a surveillance cystoscopy 2 weeks ago with no antibiotics and all was well. He was feeling fine until yesterday when he developed fever,  chills, severe myasias and came to the ER , had blood cultures sent but no source obvious so he went home. Today he was called back for positive blood cultures with group b strept. CT was done showing collection around the right thr. He has no focal pain in the right hip. He is to be transferred to [facility] for his own orthopedist to see him.      REVIEW OF SYSTEMS:  All other review of systems negative (Comprehensive ROS)    PAST MEDICAL & SURGICAL HISTORY:  Colon cancer:   HLD (hyperlipidemia)  Osteoporosis  Acetabular fracture: h/o right hip &amp; femur repair-   H/O hernia repair: h/o bilateral IHR      Allergies    No Known Allergies    Intolerances    Ambien (Other)      Antimicrobials Day #  :1  cefTRIAXone   IVPB 2000 milliGRAM(s) IV Intermittent every 24 hours    Other Medications:  aspirin  chewable 81 milliGRAM(s) Oral daily  atorvastatin 10 milliGRAM(s) Oral at bedtime  heparin  Injectable 5000 Unit(s) SubCutaneous every 12 hours  oxyCODONE    IR 5 milliGRAM(s) Oral every 8 hours PRN  tamsulosin 0.4 milliGRAM(s) Oral at bedtime      FAMILY HISTORY:  Family history of Parkinson's disease      SOCIAL HISTORY:  Smoking: [ ]Yes [x ]No  ETOH: [ ]Yes [ x]No  Drug Use: [ ]Yes [x ]No   [x ] Single[ ]    T(F): 98 (10-03-19 @ 10:35), Max: 99.6 (10-02-19 @ 17:31)  HR: 62 (10-03-19 @ 10:35)  BP: 113/69 (10-03-19 @ 10:35)  RR: 18 (10-03-19 @ 10:35)  SpO2: 99% (10-03-19 @ 10:35)  Wt(kg): --    PHYSICAL EXAM:  General: alert, no acute distress  Eyes:  anicteric, no conjunctival injection, no discharge  Oropharynx: no lesions or injection 	  Neck: supple, without adenopathy  Lungs: clear to auscultation  Heart: regular rate and rhythm; no murmur, rubs or gallops  Abdomen: soft, nondistended, nontender, without mass or organomegaly  Skin: no lesions  Extremities: no clubbing, cyanosis,. mild fluid palpated over the right hip area but no tenderness, full rom, no red  Neurologic: alert, oriented, moves all extremities    LAB RESULTS:                        12.7   16.16 )-----------( 262      ( 03 Oct 2019 11:24 )             37.0     10    135  |  100  |  20  ----------------------------<  125<H>  4.3   |  20<L>  |  0.69    Ca    8.7      03 Oct 2019 11:24    TPro  6.8  /  Alb  3.6  /  TBili  0.3  /  DBili  x   /  AST  14  /  ALT  7<L>  /  AlkPhos  59  10-03    LIVER FUNCTIONS - ( 03 Oct 2019 11:24 )  Alb: 3.6 g/dL / Pro: 6.8 g/dL / ALK PHOS: 59 U/L / ALT: 7 U/L / AST: 14 U/L / GGT: x           Urinalysis Basic - ( 02 Oct 2019 17:03 )    Color: Yellow / Appearance: Clear / S.026 / pH: x  Gluc: x / Ketone: Moderate  / Bili: Negative / Urobili: Negative   Blood: x / Protein: 100 / Nitrite: Negative   Leuk Esterase: Negative / RBC: 21 /hpf / WBC 2 /HPF   Sq Epi: x / Non Sq Epi: 1 /hpf / Bacteria: Negative        MICROBIOLOGY:  RECENT CULTURES:  10-02 @ 22:17 .Blood Blood Culture PCR    Aerobic and Anaerobic Bottle: Gram Positive Cocci in Pairs and Chains  "Due to technical problems, Proteus sp. will Not be reported as part of  the BCID panel until further notice"  ***Blood Panel PCR results on this specimen are available  approximately 3 hours after the Gram stain result.***  Gram stain, PCR, and/or culture results may not always  correspond due to difference in methodologies.  ************************************************************  This PCR assay was performed using [employer] [place].  The following targets are tested for: Enterococcus,  vancomycin resistant enterococci, Listeria monocytogenes,  coagulase negative staphylococci, S. aureus,  methicillin resistant S. aureus, Streptococcus agalactiae  (Group B), S. pneumoniae, S. pyogenes (Group A),  Acinetobacter baumannii, Enterobacter cloacae, E. coli,  Klebsiella oxytoca, K. pneumoniae, Proteus sp.,  Serratia marcescens, Haemophilus influenzae,  Neisseria meningitidis, Pseudomonas aeruginosa, Candida  albicans, C. glabrata, C krusei, C parapsilosis,  C. tropicalis and the KPC resistance gene.    Aerobic and Anaerobic Bottle: Gram Positive Cocci in Pairs and Chains          RADIOLOGY REVIEWED:  < from: CT Abdomen and Pelvis w/ IV Cont (10.03.19 @ 12:18) >  EXAM:  CT ABDOMEN AND PELVIS IC                            PROCEDURE DATE:  10/03/2019            INTERPRETATION:  CLINICAL INFORMATION: 84-year-old male with bladder   tumor, colon cancer with fever, status post right hip replacement.    COMPARISON: 2019    PROCEDURE:   CT of the Abdomen and Pelvis was performed with intravenous contrast.   Intravenous contrast: 90 ml Omnipaque 350. 10 ml discarded.  Oral contrast: None.  Sagittal and coronal reformats were performed.    FINDINGS:    LOWERCHEST: Bibasilar linear scarring.    LIVER: Bilobar low-attenuation lesions are unchanged.  BILE DUCTS: Normal caliber.  GALLBLADDER: Within normal limits.  SPLEEN: Within normal limits.  PANCREAS: Within normal limits.  ADRENALS: Within normal limits.  KIDNEYS/URETERS: Within normal limits.    BLADDER: Within normal limits. Limited evaluation of the previously   described nodular density due to streak artifact from right hip   arthroplasty.  REPRODUCTIVE ORGANS: Prostate is enlarged.    BOWEL: Status post hemicolectomy. Sigmoid diverticulosis without   diverticulitis. No bowel obstruction.   PERITONEUM: No ascites.  VESSELS: Atherosclerotic changes.  RETROPERITONEUM/LYMPH NODES: No lymphadenopathy.    ABDOMINAL WALL: Within normal limits.  BONES: Status post right hip arthroplasty. Adjacent in the right gluteal   musculature there is a fluid collection measuring 3.9 x 4.9 x 11.2 cm   consistent with a peritrochanteric abscess.    IMPRESSION:     Status post total right hip arthroplasty. Interval development of   adjacent peritrochanteric fluid collection likely representing an abscess   measuring up to 11.2 cm. Adjacent subcutaneous inflammatory changes.            < end of copied text >          Impression:  Patient with group b strept bacteremia and a large collection next to his recently placed prosthetic hip. Source could be the hip but no pain before feeling ill so maybe he got transiently bacteremic with the recent cystoscopy and then seeded the hip. No other source of bacteremia is apparent.     Recommendations:  ceftriaxone  got 1 g , give one more and do 2 g daily  follow up repeat cultures  for transfer to [facility] for orthopedic intervention  if stays would do echo

## 2019-10-03 NOTE — H&P ADULT - NSHPPHYSICALEXAM_GEN_ALL_CORE
PHYSICAL EXAMINATION:  Vital Signs Last 24 Hrs  T(C): 36.7 (03 Oct 2019 10:35), Max: 38.7 (02 Oct 2019 14:50)  T(F): 98 (03 Oct 2019 10:35), Max: 101.6 (02 Oct 2019 14:50)  HR: 62 (03 Oct 2019 10:35) (62 - 81)  BP: 113/69 (03 Oct 2019 10:35) (104/75 - 134/87)  BP(mean): --  RR: 18 (03 Oct 2019 10:35) (18 - 20)  SpO2: 99% (03 Oct 2019 10:35) (96% - 99%)  CAPILLARY BLOOD GLUCOSE            GENERAL: NAD, well-groomed,  HEAD:  atraumatic, normocephalic  EYES: sclera anicteric  ENMT: mucous membranes moist  NECK: supple, No JVD  CHEST/LUNG: clear to auscultation bilaterally;    no      rales   ,   no rhonchi,   HEART: normal S1, S2  ABDOMEN: BS+, soft, ND, NT   EXTREMITIES:    no    edema    b/l LEs  NEURO: awake, ,     moves all extremities  SKIN: no     rash PHYSICAL EXAMINATION:  Vital Signs Last 24 Hrs  T(C): 36.7 (03 Oct 2019 10:35), Max: 38.7 (02 Oct 2019 14:50)  T(F): 98 (03 Oct 2019 10:35), Max: 101.6 (02 Oct 2019 14:50)  HR: 62 (03 Oct 2019 10:35) (62 - 81)  BP: 113/69 (03 Oct 2019 10:35) (104/75 - 134/87)  BP(mean): --  RR: 18 (03 Oct 2019 10:35) (18 - 20)  SpO2: 99% (03 Oct 2019 10:35) (96% - 99%)  CAPILLARY BLOOD GLUCOSE            GENERAL: NAD, well-groomed,  HEAD:  atraumatic, normocephalic  EYES: sclera anicteric  ENMT: mucous membranes moist  NECK: supple, No JVD  CHEST/LUNG: clear to auscultation bilaterally;    no      rales   ,   no rhonchi,   HEART: normal S1, S2  ABDOMEN: BS+, soft, ND, NT   EXTREMITIES:    no    edema    b/l LEs  NEURO: awake, ,     moves all extremities  SKIN: no     rash  right hip, full rom/  scar, healed/ no tenderness/  fluctuance

## 2019-10-03 NOTE — ED PROVIDER NOTE - ATTENDING CONTRIBUTION TO CARE
Please see hpi, pe.    Blood culture from yesterday c/w bacteremia. Pt non-toxic appearing. Unclear source. Given fairly recent hip replacement, obtain ct pelvis eval for collection. Obtain repeal labs, cultures. - Lukas Gonzáles MD

## 2019-10-03 NOTE — CONSULT NOTE ADULT - PROVIDER SPECIALTY LIST ADULT
Infectious Disease s/p slip and fall at facility MR marysol historian [place]ian speaking interviewed with  does s/o left knee pain and head pain   on exam awake alert [place] abrasion forehead small hematoma mild ttp c spine left calf swollen compared ot the right and some slight effusion to the left knee n/v intact cn2-12intact  a/p image r/o orthopedic injury head trauma/basic labs eval for possible dvt/ cellulitis if all wnl ok for d/c back after mechanical fall

## 2019-10-03 NOTE — ED ADULT NURSE NOTE - OBJECTIVE STATEMENT
85 y/o male patient presents ambulatory to ED with family at the bedside - received a phone call this AM for "positive blood cultures from yesterday". Patient states he was advised to come in for further evaluation, antibiotics and admission. 83 y/o male patient presents ambulatory to ED with family at the bedside - received a phone call this AM for "positive blood cultures from yesterday". Patient states he was advised to come in for further evaluation, antibiotics and admission. Per patient he came to the ED yesterday because he was having fevers/chills x 3 days - discharged home with WBC 22, pending blood cultures and to f/u with PMD [place]. Patient states he woke up this AM with fever; took 1000mg Tylenol at 0645AM. Patient denies SOB and CP, N/V/D; afebrile in ED. 85 y/o male patient presents ambulatory to ED with family at the bedside - received a phone call this AM for "positive blood cultures from yesterday". Patient states he was advised to come in for further evaluation, antibiotics and admission. Per patient he came to the ED yesterday because he was having fevers/chills x 3 days - discharged home with WBC 21.8, pending blood cultures and to f/u with PMD [place]. Patient states he woke up this AM with fever; took 1000mg Tylenol at 0645AM. Patient denies SOB and CP, N/V/D; afebrile in ED.

## 2019-10-03 NOTE — H&P ADULT - HISTORY OF PRESENT ILLNESS
84y Male complaining of abnormal lab result.	     84M, pmh HLD, s/p R hip replacement 3 mo ago ([facility])    returns to ED for + blood cultures.  pt presented yesterday on  10/2,  for fever, chills, myalgias x 3 days. yesterday which revealed wbc 22, , pt d/c'd with blood cultures pending and to f/u with pmd [place]..  now    one set returned +for gram + cocci in pairs and chains,      pt states did have subjective fevers and chills this AM. was also complaining of mild R knee pain, no redness, swelling, impaired ROM.  no cough, congestion, n/v/d, cp, sob, abd pain, dysuria, hematuria, or other complaints.

## 2019-10-03 NOTE — H&P ADULT - NSICDXPASTSURGICALHX_GEN_ALL_CORE_FT
PAST SURGICAL HISTORY:  Acetabular fracture h/o right hip & femur repair- 2010    H/O hernia repair h/o bilateral IHR

## 2019-10-03 NOTE — ED PROVIDER NOTE - PROGRESS NOTE DETAILS
patient reporting had R hip replacement in July, has had no issues and no pain at the site since that time. reporting R knee pain intermittently. no hip pain or redness. full ROM of R hip and knee without pain. -Ruth Gonzáles PA-C CT reveals likely abscess to R hip, which most likely represents source of bacteremia. pt's procedure was done at [facility] by dr beckett, 3 mo ago. orthopedics contacted at [facility], they recommend transfer to [facility] since our ortho service will not intervene on another surgeon's procedure. dr bergeron had already seen patient and asked for admission as dr yee requested pt be admitted to dr parnell. dr bergeron was aware of pending ct. I have made dr bergeron aware of these findings and need to transfer patient. she asks that admission be removed to facilitate transfer from ED. - Lukas Gonzáles MD Case discussed with patient's surgeon, Dr. Montez. In contrast to our ortho surgeon's recommendations Dr. Montez states is OK to give a dose of Ceftriaxone. He will accept patient as a transfer to [facility]. Will work on facilitating transfer. - Lukas Gonzáles MD [facility] has accepted patient to room 319 B, [address], contact info [phone]. [facility] facilitating transfer, may be reached at [phone]. Patient signed out to ED attending Dr. Plascencia who accepts transfer and ok with accepting patient and plan. -Ruth Gonzáles PA-C

## 2019-10-03 NOTE — ED PROVIDER NOTE - OBJECTIVE STATEMENT
Attending Eliecer: 84M, pmh HLD, s/p R hip replacement 3 mo ago ([facility]) returns to ED for + blood cultures. pt presented yesterday for fever, chills, myalgias x 3 days. yesterday had labwork performed, which revealed wbc 22, rest of labs reassuring, pt d/c'd with blood cultures pending and to f/u with pmd celso. one set returned +for gram + cocci in pairs and chains, other set still pending. pt states did have subjective fevers and chills this AM. was also complaining of mild R knee pain, no redness, swelling, impaired ROM. no cough, congestion, n/v/d, cp, sob, abd pain, dysuria, hematuria, or other complaints.

## 2019-10-03 NOTE — ED PROVIDER NOTE - CLINICAL SUMMARY MEDICAL DECISION MAKING FREE TEXT BOX
85 y/o M evaluated yesterday for fever, chills unknown source, found to have negative urine and cxr, leukocytosis 21 discharged home called back for positive blood culture this AM. Reports felt febrile this AM, took tylenol. PE unremarkable. Will obtain repeat labs, cultures, evaluate hip and reassess.

## 2019-10-03 NOTE — ED POST DISCHARGE NOTE - ADDITIONAL DOCUMENTATION
10/8/19: Pt subsequently returned to ED for +BCx, was admitted, ID recommended Ceftriaxone which is appropriate based on sensitivities. pt subsequently transferred to [facility] for bacteremia 2/2 infected prosthesis, 2nd set of Bcx negative.

## 2019-10-03 NOTE — ED PROVIDER NOTE - NS ED ROS FT
Constitutional: Fever and chills  Eyes: No visual changes  HEENT: No throat pain  CV: No chest pain  Resp: No SOB no cough  GI: No abd pain, nause or vomiting  : No dysuria  MSK: No musculoskeletal pain  Skin: No rash  Neuro: No headache

## 2019-10-05 LAB
-  CEFTRIAXONE: SIGNIFICANT CHANGE UP
-  CLINDAMYCIN: SIGNIFICANT CHANGE UP
-  LEVOFLOXACIN: SIGNIFICANT CHANGE UP
-  PENICILLIN: SIGNIFICANT CHANGE UP
-  VANCOMYCIN: SIGNIFICANT CHANGE UP
CULTURE RESULTS: SIGNIFICANT CHANGE UP
METHOD TYPE: SIGNIFICANT CHANGE UP
METHOD TYPE: SIGNIFICANT CHANGE UP
ORGANISM # SPEC MICROSCOPIC CNT: SIGNIFICANT CHANGE UP
SPECIMEN SOURCE: SIGNIFICANT CHANGE UP

## 2019-10-07 LAB
CULTURE RESULTS: SIGNIFICANT CHANGE UP
SPECIMEN SOURCE: SIGNIFICANT CHANGE UP

## 2019-10-08 LAB
CULTURE RESULTS: SIGNIFICANT CHANGE UP
CULTURE RESULTS: SIGNIFICANT CHANGE UP
SPECIMEN SOURCE: SIGNIFICANT CHANGE UP
SPECIMEN SOURCE: SIGNIFICANT CHANGE UP

## 2020-02-12 NOTE — ED ADULT NURSE NOTE - ISOLATION TYPE:
Requested call back to discuss continued fever.    Please call to discuss and ok to leave detailed message.    None

## 2020-06-02 NOTE — BRIEF OPERATIVE NOTE - PRIMARY SURGEON
Medical Necessity Information: It is in your best interest to select a reason for this procedure from the list below. All of these items fulfill various CMS LCD requirements except the new and changing color options. Total Number Of Lesions Treated: 8 Consent: The patient's consent was obtained including but not limited to risks of crusting, scabbing, blistering, scarring, darker or lighter pigmentary change, recurrence, incomplete removal and infection. Goldberg Medical Necessity Clause: This procedure was medically necessary because the lesions that were treated were: Post-Care Instructions: I reviewed with the patient in detail post-care instructions. Patient is to wear sunprotection, and avoid picking at any of the treated lesions. Pt may apply Vaseline to crusted or scabbing areas. Render Post-Care Instructions In Note?: no Detail Level: Simple Include Z78.9 (Other Specified Conditions Influencing Health Status) As An Associated Diagnosis?: Yes Anesthesia Volume In Cc: 0.5

## 2020-11-05 ENCOUNTER — OUTPATIENT (OUTPATIENT)
Dept: OUTPATIENT SERVICES | Facility: HOSPITAL | Age: 85
LOS: 1 days | End: 2020-11-05
Payer: MEDICARE

## 2020-11-05 ENCOUNTER — APPOINTMENT (OUTPATIENT)
Dept: CT IMAGING | Facility: IMAGING CENTER | Age: 85
End: 2020-11-05
Payer: MEDICARE

## 2020-11-05 DIAGNOSIS — C18.2 MALIGNANT NEOPLASM OF ASCENDING COLON: ICD-10-CM

## 2020-11-05 DIAGNOSIS — Z98.89 OTHER SPECIFIED POSTPROCEDURAL STATES: Chronic | ICD-10-CM

## 2020-11-05 DIAGNOSIS — S32.409A UNSPECIFIED FRACTURE OF UNSPECIFIED ACETABULUM, INITIAL ENCOUNTER FOR CLOSED FRACTURE: Chronic | ICD-10-CM

## 2020-11-05 PROCEDURE — 71260 CT THORAX DX C+: CPT

## 2020-11-05 PROCEDURE — 82565 ASSAY OF CREATININE: CPT

## 2020-11-05 PROCEDURE — 71260 CT THORAX DX C+: CPT | Mod: 26

## 2020-11-05 PROCEDURE — 74177 CT ABD & PELVIS W/CONTRAST: CPT | Mod: 26

## 2020-11-05 PROCEDURE — 74177 CT ABD & PELVIS W/CONTRAST: CPT

## 2021-01-05 ENCOUNTER — APPOINTMENT (OUTPATIENT)
Dept: CT IMAGING | Facility: IMAGING CENTER | Age: 86
End: 2021-01-05
Payer: MEDICARE

## 2021-01-05 ENCOUNTER — OUTPATIENT (OUTPATIENT)
Dept: OUTPATIENT SERVICES | Facility: HOSPITAL | Age: 86
LOS: 1 days | End: 2021-01-05
Payer: MEDICARE

## 2021-01-05 DIAGNOSIS — Z98.89 OTHER SPECIFIED POSTPROCEDURAL STATES: Chronic | ICD-10-CM

## 2021-01-05 DIAGNOSIS — C18.2 MALIGNANT NEOPLASM OF ASCENDING COLON: ICD-10-CM

## 2021-01-05 DIAGNOSIS — S32.409A UNSPECIFIED FRACTURE OF UNSPECIFIED ACETABULUM, INITIAL ENCOUNTER FOR CLOSED FRACTURE: Chronic | ICD-10-CM

## 2021-01-05 PROCEDURE — 71250 CT THORAX DX C-: CPT

## 2021-01-05 PROCEDURE — 71250 CT THORAX DX C-: CPT | Mod: 26

## 2021-10-07 PROBLEM — Z00.00 ENCOUNTER FOR PREVENTIVE HEALTH EXAMINATION: Noted: 2021-10-07

## 2021-10-14 ENCOUNTER — APPOINTMENT (OUTPATIENT)
Dept: CT IMAGING | Facility: IMAGING CENTER | Age: 86
End: 2021-10-14
Payer: MEDICARE

## 2021-10-14 ENCOUNTER — OUTPATIENT (OUTPATIENT)
Dept: OUTPATIENT SERVICES | Facility: HOSPITAL | Age: 86
LOS: 1 days | End: 2021-10-14
Payer: MEDICARE

## 2021-10-14 DIAGNOSIS — S32.409A UNSPECIFIED FRACTURE OF UNSPECIFIED ACETABULUM, INITIAL ENCOUNTER FOR CLOSED FRACTURE: Chronic | ICD-10-CM

## 2021-10-14 DIAGNOSIS — Z98.89 OTHER SPECIFIED POSTPROCEDURAL STATES: Chronic | ICD-10-CM

## 2021-10-14 DIAGNOSIS — Z00.8 ENCOUNTER FOR OTHER GENERAL EXAMINATION: ICD-10-CM

## 2021-10-14 PROCEDURE — 71250 CT THORAX DX C-: CPT | Mod: MH

## 2021-10-14 PROCEDURE — 71250 CT THORAX DX C-: CPT | Mod: 26,MH

## 2023-07-20 ENCOUNTER — APPOINTMENT (OUTPATIENT)
Dept: SURGERY | Facility: CLINIC | Age: 88
End: 2023-07-20
Payer: MEDICARE

## 2023-07-20 VITALS
BODY MASS INDEX: 20.4 KG/M2 | SYSTOLIC BLOOD PRESSURE: 147 MMHG | HEART RATE: 80 BPM | HEIGHT: 67 IN | RESPIRATION RATE: 18 BRPM | OXYGEN SATURATION: 98 % | WEIGHT: 130 LBS | DIASTOLIC BLOOD PRESSURE: 84 MMHG | TEMPERATURE: 97.4 F

## 2023-07-20 DIAGNOSIS — B37.2 CANDIDIASIS OF SKIN AND NAIL: ICD-10-CM

## 2023-07-20 DIAGNOSIS — K64.4 RESIDUAL HEMORRHOIDAL SKIN TAGS: ICD-10-CM

## 2023-07-20 DIAGNOSIS — L29.0 PRURITUS ANI: ICD-10-CM

## 2023-07-20 DIAGNOSIS — Z85.038 PERSONAL HISTORY OF OTHER MALIGNANT NEOPLASM OF LARGE INTESTINE: ICD-10-CM

## 2023-07-20 DIAGNOSIS — Z87.19 PERSONAL HISTORY OF OTHER DISEASES OF THE DIGESTIVE SYSTEM: ICD-10-CM

## 2023-07-20 DIAGNOSIS — Z87.891 PERSONAL HISTORY OF NICOTINE DEPENDENCE: ICD-10-CM

## 2023-07-20 DIAGNOSIS — Z86.03 PERSONAL HISTORY OF NEOPLASM OF UNCERTAIN BEHAVIOR: ICD-10-CM

## 2023-07-20 PROCEDURE — 99203 OFFICE O/P NEW LOW 30 MIN: CPT

## 2023-07-20 RX ORDER — ATORVASTATIN CALCIUM 10 MG/1
10 TABLET, FILM COATED ORAL
Refills: 0 | Status: ACTIVE | COMMUNITY

## 2023-07-20 RX ORDER — MULTIVITAMIN
TABLET ORAL
Refills: 0 | Status: ACTIVE | COMMUNITY

## 2023-07-20 RX ORDER — LOSARTAN POTASSIUM 50 MG/1
50 TABLET, FILM COATED ORAL
Refills: 0 | Status: ACTIVE | COMMUNITY

## 2023-07-20 RX ORDER — LORAZEPAM 0.5 MG/1
0.5 TABLET ORAL
Refills: 0 | Status: ACTIVE | COMMUNITY

## 2023-07-20 RX ORDER — PSYLLIUM HUSK 0.4 G
CAPSULE ORAL
Refills: 0 | Status: ACTIVE | COMMUNITY

## 2023-07-20 RX ORDER — TAMSULOSIN HYDROCHLORIDE 0.4 MG/1
CAPSULE ORAL
Refills: 0 | Status: DISCONTINUED | COMMUNITY
End: 2023-07-20

## 2023-07-20 RX ORDER — ATORVASTATIN CALCIUM 80 MG/1
TABLET, FILM COATED ORAL
Refills: 0 | Status: DISCONTINUED | COMMUNITY
End: 2023-07-20

## 2023-07-20 RX ORDER — FLUCONAZOLE 200 MG/1
200 TABLET ORAL
Qty: 5 | Refills: 0 | Status: ACTIVE | COMMUNITY
Start: 2023-07-20 | End: 1900-01-01

## 2023-07-20 RX ORDER — BACILLUS COAGULANS/INULIN 1B-250 MG
CAPSULE ORAL
Refills: 0 | Status: DISCONTINUED | COMMUNITY
End: 2023-07-20

## 2023-07-20 RX ORDER — DOCUSATE SODIUM 100 MG/1
CAPSULE ORAL
Refills: 0 | Status: ACTIVE | COMMUNITY

## 2023-07-20 RX ORDER — ASPIRIN 81 MG
81 TABLET, DELAYED RELEASE (ENTERIC COATED) ORAL
Refills: 0 | Status: ACTIVE | COMMUNITY

## 2023-07-20 RX ORDER — TAMSULOSIN HYDROCHLORIDE 0.4 MG/1
0.4 CAPSULE ORAL
Refills: 0 | Status: ACTIVE | COMMUNITY

## 2023-07-20 RX ORDER — BISMUTH SUBSALICYLATE 525 MG/1
TABLET ORAL
Refills: 0 | Status: ACTIVE | COMMUNITY

## 2023-07-20 NOTE — PHYSICAL EXAM
[Normal Breath Sounds] : Normal breath sounds [Normal Heart Sounds] : normal heart sounds [No Rash or Lesion] : No rash or lesion [Alert] : alert [Oriented to Person] : oriented to person [Oriented to Place] : oriented to place [Oriented to Time] : oriented to time [Calm] : calm [de-identified] : WNL [de-identified] : WNL [de-identified] : ALEJANDROL [de-identified] : WNL [FreeTextEntry1] : Perianal inspection demonstrated circumferential erythema of the perianal skin extending up towards the intergluteal cleft.  Mild external hemorrhoid enlargement.  Digital exam and anoscopy demonstrated mild internal hemorrhoid enlargement.\par \par Anoscopy performed to evaluate internal hemorrhoids.

## 2023-07-20 NOTE — ASSESSMENT
[FreeTextEntry1] : I have seen and evaluated patient and I have corroborated all nursing input into this note.  Patient appears to have had a thrombosed external hemorrhoid which resolved.  He has been applying topical hydrocortisone for several weeks and now has pruritus ani.  I informed the patient that prolonged use of hydrocortisone can thin perianal skin and result in irritation.  In addition it can predispose patients to Candida dermatitis.  I recommended that he discontinue the topical hydrocortisone.  I prescribed a short course of Diflucan.  I also prescribed Silvadene cream to help with skin healing.  Perianal hygiene was also discussed.  The patient will follow-up as needed.

## 2023-07-20 NOTE — CONSULT LETTER
[Dear  ___] : Dear ~ABDOULAYE, [Courtesy Letter:] : I had the pleasure of seeing your patient, [unfilled], in my office today. [Please see my note below.] : Please see my note below. [Consult Closing:] : Thank you very much for allowing me to participate in the care of this patient.  If you have any questions, please do not hesitate to contact me. [Sincerely,] : Sincerely, [FreeTextEntry2] : Dr. Trey Freed [FreeTextEntry3] : Antonio Coburn M.D., CHAGO.SINDI., [email or web address].\[facility] Chief Colorectal Clinical Services, [facility]

## 2023-07-20 NOTE — HISTORY OF PRESENT ILLNESS
[FreeTextEntry1] : Puneet is a 87 y/o male here for a consultation visit, rectal burning and pain. \par \par S/p single - incision laparoscopic left colectomy with anastomosis between distal ascending colon and proximal descending colon. Appendectomy on 6/12/15. Due to distal transverse colon cancer. \par Pathology - 1. Appendix, appendectomy: appendix with fibrous obliteration. 2. 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 Perineural and lymphovascular space invasion is identified. Seventeen lymph nodes, negative for carcinoma (0/17). Resection margins are negative for carcinoma. pTNM Stage (AJCC 7th ed): pT3, N0\par \par Colonoscopy on 4/12/16 - Benign neoplasm of sigmoid colon. Benign neoplasm of transverse colon. Benign neoplasm of rectum. Hemorrhoids. \par \par Today pt reports feeling daily rectal burning for two weeks and pain for 1 week takes tylenol or advil, he also uses hydrocortisone cream for few weeks with little relief.   Formed/hard BMs daily, takes Colace and Metamucil daily, occasional straining, no bleeding.  Does have prolapsing tissue or swelling but not as swollen today.  No episodes of incontinence of stool or flatus.  Good appetite.  No c/o nausea/vomiting.  Denies fever and chills.  Patient is on baby aspirin prophylactically.  \par

## 2023-09-04 ENCOUNTER — EMERGENCY (EMERGENCY)
Facility: HOSPITAL | Age: 88
LOS: 1 days | Discharge: HOME | End: 2023-09-04
Attending: STUDENT IN AN ORGANIZED HEALTH CARE EDUCATION/TRAINING PROGRAM
Payer: MEDICARE

## 2023-09-04 VITALS
OXYGEN SATURATION: 97 % | HEART RATE: 61 BPM | SYSTOLIC BLOOD PRESSURE: 153 MMHG | HEIGHT: 68 IN | DIASTOLIC BLOOD PRESSURE: 77 MMHG | TEMPERATURE: 99 F | RESPIRATION RATE: 18 BRPM | WEIGHT: 136.69 LBS

## 2023-09-04 DIAGNOSIS — S32.409A UNSPECIFIED FRACTURE OF UNSPECIFIED ACETABULUM, INITIAL ENCOUNTER FOR CLOSED FRACTURE: Chronic | ICD-10-CM

## 2023-09-04 DIAGNOSIS — Z98.89 OTHER SPECIFIED POSTPROCEDURAL STATES: Chronic | ICD-10-CM

## 2023-09-04 LAB
ALBUMIN SERPL ELPH-MCNC: 3.5 G/DL — SIGNIFICANT CHANGE UP (ref 3.5–5)
ALP SERPL-CCNC: 66 U/L — SIGNIFICANT CHANGE UP (ref 40–120)
ALT FLD-CCNC: 19 U/L DA — SIGNIFICANT CHANGE UP (ref 10–60)
ANION GAP SERPL CALC-SCNC: 6 MMOL/L — SIGNIFICANT CHANGE UP (ref 5–17)
APPEARANCE UR: CLEAR — SIGNIFICANT CHANGE UP
AST SERPL-CCNC: 11 U/L — SIGNIFICANT CHANGE UP (ref 10–40)
BACTERIA # UR AUTO: ABNORMAL /HPF
BASOPHILS # BLD AUTO: 0.04 K/UL — SIGNIFICANT CHANGE UP (ref 0–0.2)
BASOPHILS NFR BLD AUTO: 0.5 % — SIGNIFICANT CHANGE UP (ref 0–2)
BILIRUB SERPL-MCNC: 0.4 MG/DL — SIGNIFICANT CHANGE UP (ref 0.2–1.2)
BILIRUB UR-MCNC: ABNORMAL
BUN SERPL-MCNC: 24 MG/DL — HIGH (ref 7–18)
CALCIUM SERPL-MCNC: 8.6 MG/DL — SIGNIFICANT CHANGE UP (ref 8.4–10.5)
CHLORIDE SERPL-SCNC: 106 MMOL/L — SIGNIFICANT CHANGE UP (ref 96–108)
CO2 SERPL-SCNC: 27 MMOL/L — SIGNIFICANT CHANGE UP (ref 22–31)
COLOR SPEC: SIGNIFICANT CHANGE UP
CREAT SERPL-MCNC: 0.95 MG/DL — SIGNIFICANT CHANGE UP (ref 0.5–1.3)
DIFF PNL FLD: NEGATIVE — SIGNIFICANT CHANGE UP
EGFR: 78 ML/MIN/1.73M2 — SIGNIFICANT CHANGE UP
EOSINOPHIL # BLD AUTO: 0.11 K/UL — SIGNIFICANT CHANGE UP (ref 0–0.5)
EOSINOPHIL NFR BLD AUTO: 1.5 % — SIGNIFICANT CHANGE UP (ref 0–6)
EPI CELLS # UR: PRESENT
GLUCOSE SERPL-MCNC: 101 MG/DL — HIGH (ref 70–99)
GLUCOSE UR QL: NEGATIVE MG/DL — SIGNIFICANT CHANGE UP
HCT VFR BLD CALC: 36.7 % — LOW (ref 39–50)
HGB BLD-MCNC: 12.5 G/DL — LOW (ref 13–17)
HYALINE CASTS # UR AUTO: PRESENT
IMM GRANULOCYTES NFR BLD AUTO: 0.1 % — SIGNIFICANT CHANGE UP (ref 0–0.9)
KETONES UR-MCNC: ABNORMAL MG/DL
LEUKOCYTE ESTERASE UR-ACNC: ABNORMAL
LIDOCAIN IGE QN: 61 U/L — SIGNIFICANT CHANGE UP (ref 13–75)
LYMPHOCYTES # BLD AUTO: 1.63 K/UL — SIGNIFICANT CHANGE UP (ref 1–3.3)
LYMPHOCYTES # BLD AUTO: 22.1 % — SIGNIFICANT CHANGE UP (ref 13–44)
MCHC RBC-ENTMCNC: 32.6 PG — SIGNIFICANT CHANGE UP (ref 27–34)
MCHC RBC-ENTMCNC: 34.1 GM/DL — SIGNIFICANT CHANGE UP (ref 32–36)
MCV RBC AUTO: 95.8 FL — SIGNIFICANT CHANGE UP (ref 80–100)
MONOCYTES # BLD AUTO: 0.69 K/UL — SIGNIFICANT CHANGE UP (ref 0–0.9)
MONOCYTES NFR BLD AUTO: 9.3 % — SIGNIFICANT CHANGE UP (ref 2–14)
NEUTROPHILS # BLD AUTO: 4.9 K/UL — SIGNIFICANT CHANGE UP (ref 1.8–7.4)
NEUTROPHILS NFR BLD AUTO: 66.5 % — SIGNIFICANT CHANGE UP (ref 43–77)
NITRITE UR-MCNC: NEGATIVE — SIGNIFICANT CHANGE UP
NRBC # BLD: 0 /100 WBCS — SIGNIFICANT CHANGE UP (ref 0–0)
PH UR: 5 — SIGNIFICANT CHANGE UP (ref 5–8)
PLATELET # BLD AUTO: 291 K/UL — SIGNIFICANT CHANGE UP (ref 150–400)
POTASSIUM SERPL-MCNC: 4.3 MMOL/L — SIGNIFICANT CHANGE UP (ref 3.5–5.3)
POTASSIUM SERPL-SCNC: 4.3 MMOL/L — SIGNIFICANT CHANGE UP (ref 3.5–5.3)
PROT SERPL-MCNC: 6.7 G/DL — SIGNIFICANT CHANGE UP (ref 6–8.3)
PROT UR-MCNC: NEGATIVE MG/DL — SIGNIFICANT CHANGE UP
RBC # BLD: 3.83 M/UL — LOW (ref 4.2–5.8)
RBC # FLD: 13.5 % — SIGNIFICANT CHANGE UP (ref 10.3–14.5)
RBC CASTS # UR COMP ASSIST: 7 /HPF — HIGH (ref 0–4)
SODIUM SERPL-SCNC: 139 MMOL/L — SIGNIFICANT CHANGE UP (ref 135–145)
SP GR SPEC: 1.03 — SIGNIFICANT CHANGE UP (ref 1–1.03)
UROBILINOGEN FLD QL: 1 MG/DL — SIGNIFICANT CHANGE UP (ref 0.2–1)
WBC # BLD: 7.38 K/UL — SIGNIFICANT CHANGE UP (ref 3.8–10.5)
WBC # FLD AUTO: 7.38 K/UL — SIGNIFICANT CHANGE UP (ref 3.8–10.5)
WBC UR QL: 1 /HPF — SIGNIFICANT CHANGE UP (ref 0–5)

## 2023-09-04 PROCEDURE — 83690 ASSAY OF LIPASE: CPT

## 2023-09-04 PROCEDURE — 87086 URINE CULTURE/COLONY COUNT: CPT

## 2023-09-04 PROCEDURE — 99285 EMERGENCY DEPT VISIT HI MDM: CPT

## 2023-09-04 PROCEDURE — 96374 THER/PROPH/DIAG INJ IV PUSH: CPT | Mod: XU

## 2023-09-04 PROCEDURE — 74177 CT ABD & PELVIS W/CONTRAST: CPT | Mod: MA

## 2023-09-04 PROCEDURE — 80053 COMPREHEN METABOLIC PANEL: CPT

## 2023-09-04 PROCEDURE — 81001 URINALYSIS AUTO W/SCOPE: CPT

## 2023-09-04 PROCEDURE — 99284 EMERGENCY DEPT VISIT MOD MDM: CPT | Mod: 25

## 2023-09-04 PROCEDURE — 74177 CT ABD & PELVIS W/CONTRAST: CPT | Mod: 26,MA

## 2023-09-04 PROCEDURE — 36415 COLL VENOUS BLD VENIPUNCTURE: CPT

## 2023-09-04 PROCEDURE — 85025 COMPLETE CBC W/AUTO DIFF WBC: CPT

## 2023-09-04 RX ORDER — KETOROLAC TROMETHAMINE 30 MG/ML
30 SYRINGE (ML) INJECTION ONCE
Refills: 0 | Status: DISCONTINUED | OUTPATIENT
Start: 2023-09-04 | End: 2023-09-04

## 2023-09-04 RX ADMIN — Medication 30 MILLIGRAM(S): at 22:42

## 2023-09-04 RX ADMIN — Medication 30 MILLIGRAM(S): at 23:42

## 2023-09-04 NOTE — ED PROVIDER NOTE - PHYSICAL EXAMINATION
General: well appearing male, no acute distress   HEENT: normocephalic, atraumatic   Respiratory: normal work of breathing  Abdomen: soft, non-tender, no guarding or rebound   MSK: no swelling or tenderness of lower extremities, moving all extremities spontaneously   Skin: warm, dry   Neuro: A&Ox3  Psych: appropriate affect

## 2023-09-04 NOTE — ED PROVIDER NOTE - PATIENT PORTAL LINK FT
You can access the FollowMyHealth Patient Portal offered by [facility] by registering at the following website: [email or web address]. By joining [employer]’s FollowMyHealth portal, you will also be able to view your health information using other applications (apps) compatible with our system.

## 2023-09-04 NOTE — ED PROVIDER NOTE - OBJECTIVE STATEMENT
88-year-old male, presenting with abdominal pain.  Patient reports pain began approximately 1 hour after lifting heavy groceries.  Reports pain feels like a band along his lower abdomen but denies any vomiting, diarrhea, chest pain or trouble breathing.

## 2023-09-04 NOTE — ED PROVIDER NOTE - CLINICAL SUMMARY MEDICAL DECISION MAKING FREE TEXT BOX
88-year-old male presenting with abdominal pain.  Nontender abdomen on exam and no vomiting or diarrhea.  Symptoms likely secondary to MSK pain will get labs and CT abdomen to assess.

## 2023-09-04 NOTE — ED PROVIDER NOTE - NSFOLLOWUPINSTRUCTIONS_ED_ALL_ED_FT
You were seen in the emergency department for abdominal pain.    Please follow-up with your primary care doctor within the next 48 hours.     If you have any worsening symptoms, severe chest pain, trouble breathing, abdominal pain, nausea or vomiting, please return to the emergency department. Return to [address] [place] [address] imaging center 24 hours for right lower leg ultrasound study.  Bring requisition form with you.  Return to ED immediately if you develop chest pain or shortness of breath, severe abdominal pain/vomiting/bloody stool, worsening leg swelling or pain.    You were seen in the emergency department for abdominal pain.    Please follow-up with your primary care doctor within the next 48 hours.     If you have any worsening symptoms, severe chest pain, trouble breathing, abdominal pain, nausea or vomiting, please return to the emergency department.

## 2023-09-04 NOTE — ED PROVIDER NOTE - PROGRESS NOTE DETAILS
Patient signed out to me by Dr. Baker.  Awaiting CT abdomen and pelvis.  CT shows No small bowel obstruction or active bowel inflammation.Question thrombus versus contrast mixing in the right common femoral vein. Right lower extremity venous Doppler ultrasound suggested for   further evaluation.  Discussed above with patient.  Patient reports that he had right lower leg ultrasound done 2 weeks ago for leg swelling which was negative for blood clot.  Patient agrees with plan for discharge to return to imaging center for Doppler study during daytime hours.  Patient stable for discharge with careful return to ED precautions.  ~Isabela PALOMARES

## 2023-09-05 VITALS
RESPIRATION RATE: 18 BRPM | OXYGEN SATURATION: 97 % | DIASTOLIC BLOOD PRESSURE: 75 MMHG | TEMPERATURE: 98 F | HEART RATE: 55 BPM | SYSTOLIC BLOOD PRESSURE: 149 MMHG

## 2023-09-05 LAB
CULTURE RESULTS: SIGNIFICANT CHANGE UP
SPECIMEN SOURCE: SIGNIFICANT CHANGE UP

## 2023-09-05 RX ORDER — ACETAMINOPHEN 500 MG
650 TABLET ORAL ONCE
Refills: 0 | Status: COMPLETED | OUTPATIENT
Start: 2023-09-05 | End: 2023-09-05

## 2023-09-05 NOTE — ED ADULT NURSE NOTE - CAS TRG GEN SKIN COLOR
[facility] Call Center    Phone Message    May a detailed message be left on voicemail: yes     Reason for Call: Other: Patient calling to reschedule echo and monitor. He has a sinus infection and will be on medication for 20 days. He wanted to be sure that a message got to his care team so they knew why he rescheduled.    Thank you!  [facility]      Action Taken: Other: Cardiology    Travel Screening: Not Applicable                                                                         Normal for race

## 2024-01-18 RX ORDER — SILVER SULFADIAZINE 10 MG/G
1 CREAM TOPICAL
Qty: 1 | Refills: 2 | Status: ACTIVE | COMMUNITY
Start: 2023-07-20 | End: 1900-01-01

## 2025-03-26 NOTE — ED ADULT NURSE NOTE - NS ED NURSE RECORD ANOTHER HT AND WT
Member Name: Constantin Bobo   Member Number: [id number]   Reference Number: [id number]   Approved Services: Consultation   Approved Service Dates: 03/20/2025 - 03/20/2026   Requesting Provider: Norma Ansari   Requested Provider: [facility]     Dear Constantin Bobo:     The following medical service(s) requested by Norma Ansari have been approved:    Procedure Code Procedure Code Name Requested Quantity Approved Quantity Status   62495 (CPT®) [place] OFFICE/OUTPATIENT NEW MODERATE MDM 45 MINUTES 1 1 Authorized   49702 (CPT®) [place] OFFICE/OUTPATIENT ESTABLISHED MOD MDM 30 MIN 5 5 Authorized       Approved Quantity means the number of visits approved for medication treatments and/or medical services.    The services should be provided by [facility] no later than 03/20/2026. Please contact the provider listed below with any questions.     Provider Information:  [facility]  [phone]    Your plan benefit may require a deductible, co-payment or coinsurance for these services. This authorization does not guarantee [facility] will pay the claim for services that you receive. Payment by [facility] for these services is subject to the terms of your Evidence of Coverage or Summary Plan Description, your eligibility at the time of service, and confirmation of benefit coverage.    For any questions or additional information, please contact Customer Service:    [employer]  Customer Service: [phone] or toll free [phone]  [employer] users dial: [phone]   Call Center Hours: Mon - Fri 7 AM to 8 PM PST   Office Hours: Mon - Fri 8 AM to 5 PM [facility]   E-mail: [email or web address]   Website: [email or web address]       This information is available for free in other languages. Please contact Customer Service at the phone number above for more information. [facility] complies with applicable Federal civil rights laws and does not discriminate on  the basis of race, color, national origin, age, disability or sex.      Sincerely,     Healthcare Utilization Management Department     Cc: [facility]   Norma Ansari          Yes

## 2025-07-16 NOTE — ED ADULT NURSE NOTE - NSFALLUNIVINTERV_ED_ALL_ED
[Time Spent: ___ minutes] : I have spent [unfilled] minutes of time on the encounter which excludes teaching and separately reported services.
Bed/Stretcher in lowest position, wheels locked, appropriate side rails in place/Call bell, personal items and telephone in reach/Instruct patient to call for assistance before getting out of bed/chair/stretcher/Non-slip footwear applied when patient is off stretcher/[place] to call system/Physically safe environment - no spills, clutter or unnecessary equipment/Purposeful proactive rounding/Room/bathroom lighting operational, light cord in reach